# Patient Record
Sex: MALE | Race: BLACK OR AFRICAN AMERICAN | Employment: OTHER | URBAN - METROPOLITAN AREA
[De-identification: names, ages, dates, MRNs, and addresses within clinical notes are randomized per-mention and may not be internally consistent; named-entity substitution may affect disease eponyms.]

---

## 2018-05-28 ENCOUNTER — HOSPITAL ENCOUNTER (INPATIENT)
Age: 58
LOS: 3 days | Discharge: HOME | DRG: 638 | End: 2018-05-31
Attending: EMERGENCY MEDICINE | Admitting: INTERNAL MEDICINE
Payer: SELF-PAY

## 2018-05-28 DIAGNOSIS — R11.2 NON-INTRACTABLE VOMITING WITH NAUSEA, UNSPECIFIED VOMITING TYPE: ICD-10-CM

## 2018-05-28 DIAGNOSIS — E11.65 UNCONTROLLED TYPE 2 DIABETES MELLITUS WITH HYPERGLYCEMIA, WITH LONG-TERM CURRENT USE OF INSULIN (HCC): ICD-10-CM

## 2018-05-28 DIAGNOSIS — R63.4 WEIGHT LOSS: ICD-10-CM

## 2018-05-28 DIAGNOSIS — I10 ACCELERATED HYPERTENSION: ICD-10-CM

## 2018-05-28 DIAGNOSIS — N17.9 ACUTE KIDNEY INJURY (HCC): ICD-10-CM

## 2018-05-28 DIAGNOSIS — E11.10 DIABETIC KETOACIDOSIS WITHOUT COMA ASSOCIATED WITH TYPE 2 DIABETES MELLITUS (HCC): Primary | ICD-10-CM

## 2018-05-28 DIAGNOSIS — E87.5 ACUTE HYPERKALEMIA: ICD-10-CM

## 2018-05-28 DIAGNOSIS — Z79.4 UNCONTROLLED TYPE 2 DIABETES MELLITUS WITH HYPERGLYCEMIA, WITH LONG-TERM CURRENT USE OF INSULIN (HCC): ICD-10-CM

## 2018-05-28 DIAGNOSIS — E87.20 LACTIC ACID INCREASED: ICD-10-CM

## 2018-05-28 LAB
ALBUMIN SERPL-MCNC: 3.8 G/DL (ref 3.5–5)
ALBUMIN SERPL-MCNC: 4.3 G/DL (ref 3.5–5)
ALBUMIN/GLOB SERPL: 0.9 {RATIO} (ref 1.1–2.2)
ALBUMIN/GLOB SERPL: 0.9 {RATIO} (ref 1.1–2.2)
ALP SERPL-CCNC: 102 U/L (ref 45–117)
ALP SERPL-CCNC: 116 U/L (ref 45–117)
ALT SERPL-CCNC: 28 U/L (ref 12–78)
ALT SERPL-CCNC: 30 U/L (ref 12–78)
ANION GAP SERPL CALC-SCNC: 15 MMOL/L (ref 5–15)
ANION GAP SERPL CALC-SCNC: 19 MMOL/L (ref 5–15)
APPEARANCE UR: CLEAR
AST SERPL-CCNC: 10 U/L (ref 15–37)
AST SERPL-CCNC: 6 U/L (ref 15–37)
BACTERIA URNS QL MICRO: NEGATIVE /HPF
BASOPHILS # BLD: 0.1 K/UL (ref 0–0.1)
BASOPHILS NFR BLD: 0 % (ref 0–1)
BILIRUB SERPL-MCNC: 0.4 MG/DL (ref 0.2–1)
BILIRUB SERPL-MCNC: 0.5 MG/DL (ref 0.2–1)
BILIRUB UR QL: NEGATIVE
BUN SERPL-MCNC: 21 MG/DL (ref 6–20)
BUN SERPL-MCNC: 22 MG/DL (ref 6–20)
BUN/CREAT SERPL: 14 (ref 12–20)
BUN/CREAT SERPL: 14 (ref 12–20)
CALCIUM SERPL-MCNC: 10.7 MG/DL (ref 8.5–10.1)
CALCIUM SERPL-MCNC: 9.6 MG/DL (ref 8.5–10.1)
CHLORIDE SERPL-SCNC: 102 MMOL/L (ref 97–108)
CHLORIDE SERPL-SCNC: 98 MMOL/L (ref 97–108)
CO2 SERPL-SCNC: 18 MMOL/L (ref 21–32)
CO2 SERPL-SCNC: 20 MMOL/L (ref 21–32)
COLOR UR: ABNORMAL
CREAT SERPL-MCNC: 1.53 MG/DL (ref 0.7–1.3)
CREAT SERPL-MCNC: 1.6 MG/DL (ref 0.7–1.3)
DIFFERENTIAL METHOD BLD: ABNORMAL
EOSINOPHIL # BLD: 0 K/UL (ref 0–0.4)
EOSINOPHIL NFR BLD: 0 % (ref 0–7)
EPITH CASTS URNS QL MICRO: ABNORMAL /LPF
ERYTHROCYTE [DISTWIDTH] IN BLOOD BY AUTOMATED COUNT: 12.5 % (ref 11.5–14.5)
GLOBULIN SER CALC-MCNC: 4.1 G/DL (ref 2–4)
GLOBULIN SER CALC-MCNC: 4.6 G/DL (ref 2–4)
GLUCOSE BLD STRIP.AUTO-MCNC: 530 MG/DL (ref 65–100)
GLUCOSE BLD STRIP.AUTO-MCNC: 560 MG/DL (ref 65–100)
GLUCOSE BLD STRIP.AUTO-MCNC: 561 MG/DL (ref 65–100)
GLUCOSE SERPL-MCNC: 551 MG/DL (ref 65–100)
GLUCOSE SERPL-MCNC: 580 MG/DL (ref 65–100)
GLUCOSE UR STRIP.AUTO-MCNC: >1000 MG/DL
HCT VFR BLD AUTO: 44.3 % (ref 36.6–50.3)
HGB BLD-MCNC: 15.3 G/DL (ref 12.1–17)
HGB UR QL STRIP: NEGATIVE
HYALINE CASTS URNS QL MICRO: ABNORMAL /LPF (ref 0–5)
IMM GRANULOCYTES # BLD: 0.1 K/UL (ref 0–0.04)
IMM GRANULOCYTES NFR BLD AUTO: 0 % (ref 0–0.5)
KETONES UR QL STRIP.AUTO: 80 MG/DL
LACTATE SERPL-SCNC: 3.1 MMOL/L (ref 0.4–2)
LEUKOCYTE ESTERASE UR QL STRIP.AUTO: NEGATIVE
LIPASE SERPL-CCNC: 108 U/L (ref 73–393)
LYMPHOCYTES # BLD: 2.7 K/UL (ref 0.8–3.5)
LYMPHOCYTES NFR BLD: 20 % (ref 12–49)
MAGNESIUM SERPL-MCNC: 2.5 MG/DL (ref 1.6–2.4)
MCH RBC QN AUTO: 31.5 PG (ref 26–34)
MCHC RBC AUTO-ENTMCNC: 34.5 G/DL (ref 30–36.5)
MCV RBC AUTO: 91.2 FL (ref 80–99)
MONOCYTES # BLD: 0.4 K/UL (ref 0–1)
MONOCYTES NFR BLD: 3 % (ref 5–13)
NEUTS SEG # BLD: 10.3 K/UL (ref 1.8–8)
NEUTS SEG NFR BLD: 76 % (ref 32–75)
NITRITE UR QL STRIP.AUTO: NEGATIVE
NRBC # BLD: 0 K/UL (ref 0–0.01)
NRBC BLD-RTO: 0 PER 100 WBC
PH UR STRIP: 5 [PH] (ref 5–8)
PLATELET # BLD AUTO: 265 K/UL (ref 150–400)
PMV BLD AUTO: 10.2 FL (ref 8.9–12.9)
POTASSIUM SERPL-SCNC: 4.5 MMOL/L (ref 3.5–5.1)
POTASSIUM SERPL-SCNC: 5.2 MMOL/L (ref 3.5–5.1)
PROT SERPL-MCNC: 7.9 G/DL (ref 6.4–8.2)
PROT SERPL-MCNC: 8.9 G/DL (ref 6.4–8.2)
PROT UR STRIP-MCNC: NEGATIVE MG/DL
RBC # BLD AUTO: 4.86 M/UL (ref 4.1–5.7)
RBC #/AREA URNS HPF: ABNORMAL /HPF (ref 0–5)
SERVICE CMNT-IMP: ABNORMAL
SODIUM SERPL-SCNC: 135 MMOL/L (ref 136–145)
SODIUM SERPL-SCNC: 137 MMOL/L (ref 136–145)
SP GR UR REFRACTOMETRY: 1.02 (ref 1–1.03)
UA: UC IF INDICATED,UAUC: ABNORMAL
UROBILINOGEN UR QL STRIP.AUTO: 0.2 EU/DL (ref 0.2–1)
WBC # BLD AUTO: 13.5 K/UL (ref 4.1–11.1)
WBC URNS QL MICRO: ABNORMAL /HPF (ref 0–4)

## 2018-05-28 PROCEDURE — 74011250636 HC RX REV CODE- 250/636: Performed by: EMERGENCY MEDICINE

## 2018-05-28 PROCEDURE — 36415 COLL VENOUS BLD VENIPUNCTURE: CPT | Performed by: EMERGENCY MEDICINE

## 2018-05-28 PROCEDURE — 99285 EMERGENCY DEPT VISIT HI MDM: CPT

## 2018-05-28 PROCEDURE — 74011636637 HC RX REV CODE- 636/637: Performed by: EMERGENCY MEDICINE

## 2018-05-28 PROCEDURE — 82962 GLUCOSE BLOOD TEST: CPT

## 2018-05-28 PROCEDURE — 83690 ASSAY OF LIPASE: CPT | Performed by: EMERGENCY MEDICINE

## 2018-05-28 PROCEDURE — 83036 HEMOGLOBIN GLYCOSYLATED A1C: CPT | Performed by: EMERGENCY MEDICINE

## 2018-05-28 PROCEDURE — 96375 TX/PRO/DX INJ NEW DRUG ADDON: CPT

## 2018-05-28 PROCEDURE — 96361 HYDRATE IV INFUSION ADD-ON: CPT

## 2018-05-28 PROCEDURE — 85025 COMPLETE CBC W/AUTO DIFF WBC: CPT | Performed by: EMERGENCY MEDICINE

## 2018-05-28 PROCEDURE — 96374 THER/PROPH/DIAG INJ IV PUSH: CPT

## 2018-05-28 PROCEDURE — 80053 COMPREHEN METABOLIC PANEL: CPT | Performed by: EMERGENCY MEDICINE

## 2018-05-28 PROCEDURE — 81001 URINALYSIS AUTO W/SCOPE: CPT | Performed by: EMERGENCY MEDICINE

## 2018-05-28 PROCEDURE — 83605 ASSAY OF LACTIC ACID: CPT | Performed by: EMERGENCY MEDICINE

## 2018-05-28 PROCEDURE — 83735 ASSAY OF MAGNESIUM: CPT | Performed by: EMERGENCY MEDICINE

## 2018-05-28 PROCEDURE — 65660000000 HC RM CCU STEPDOWN

## 2018-05-28 RX ORDER — INSULIN LISPRO 100 [IU]/ML
INJECTION, SOLUTION INTRAVENOUS; SUBCUTANEOUS
Status: DISCONTINUED | OUTPATIENT
Start: 2018-05-29 | End: 2018-05-28

## 2018-05-28 RX ORDER — ENOXAPARIN SODIUM 100 MG/ML
40 INJECTION SUBCUTANEOUS DAILY
Status: DISCONTINUED | OUTPATIENT
Start: 2018-05-29 | End: 2018-05-31 | Stop reason: HOSPADM

## 2018-05-28 RX ORDER — ONDANSETRON 2 MG/ML
8 INJECTION INTRAMUSCULAR; INTRAVENOUS
Status: COMPLETED | OUTPATIENT
Start: 2018-05-28 | End: 2018-05-28

## 2018-05-28 RX ORDER — DEXTROSE 50 % IN WATER (D50W) INTRAVENOUS SYRINGE
12.5-25 AS NEEDED
Status: DISCONTINUED | OUTPATIENT
Start: 2018-05-28 | End: 2018-05-31 | Stop reason: HOSPADM

## 2018-05-28 RX ORDER — SODIUM CHLORIDE 0.9 % (FLUSH) 0.9 %
5-10 SYRINGE (ML) INJECTION EVERY 8 HOURS
Status: DISCONTINUED | OUTPATIENT
Start: 2018-05-28 | End: 2018-05-31 | Stop reason: HOSPADM

## 2018-05-28 RX ORDER — ONDANSETRON 2 MG/ML
4 INJECTION INTRAMUSCULAR; INTRAVENOUS
Status: DISCONTINUED | OUTPATIENT
Start: 2018-05-28 | End: 2018-05-31 | Stop reason: HOSPADM

## 2018-05-28 RX ORDER — INSULIN ASPART 100 [IU]/ML
INJECTION, SUSPENSION SUBCUTANEOUS
COMMUNITY
End: 2018-05-31

## 2018-05-28 RX ORDER — SODIUM CHLORIDE 0.9 % (FLUSH) 0.9 %
5-10 SYRINGE (ML) INJECTION AS NEEDED
Status: DISCONTINUED | OUTPATIENT
Start: 2018-05-28 | End: 2018-05-31 | Stop reason: HOSPADM

## 2018-05-28 RX ORDER — SODIUM CHLORIDE 9 MG/ML
125 INJECTION, SOLUTION INTRAVENOUS CONTINUOUS
Status: DISCONTINUED | OUTPATIENT
Start: 2018-05-28 | End: 2018-05-30

## 2018-05-28 RX ORDER — MAGNESIUM SULFATE 100 %
4 CRYSTALS MISCELLANEOUS AS NEEDED
Status: DISCONTINUED | OUTPATIENT
Start: 2018-05-28 | End: 2018-05-28

## 2018-05-28 RX ORDER — SODIUM CHLORIDE 0.9 % (FLUSH) 0.9 %
5-10 SYRINGE (ML) INJECTION AS NEEDED
Status: DISCONTINUED | OUTPATIENT
Start: 2018-05-28 | End: 2018-05-28

## 2018-05-28 RX ORDER — ACETAMINOPHEN 325 MG/1
650 TABLET ORAL
Status: DISCONTINUED | OUTPATIENT
Start: 2018-05-28 | End: 2018-05-31 | Stop reason: HOSPADM

## 2018-05-28 RX ORDER — INSULIN LISPRO 100 [IU]/ML
INJECTION, SOLUTION INTRAVENOUS; SUBCUTANEOUS
Status: DISCONTINUED | OUTPATIENT
Start: 2018-05-29 | End: 2018-05-29

## 2018-05-28 RX ORDER — DEXTROSE 50 % IN WATER (D50W) INTRAVENOUS SYRINGE
12.5-25 AS NEEDED
Status: DISCONTINUED | OUTPATIENT
Start: 2018-05-28 | End: 2018-05-28

## 2018-05-28 RX ORDER — MAGNESIUM SULFATE 100 %
4 CRYSTALS MISCELLANEOUS AS NEEDED
Status: DISCONTINUED | OUTPATIENT
Start: 2018-05-28 | End: 2018-05-29 | Stop reason: SDUPTHER

## 2018-05-28 RX ADMIN — INSULIN HUMAN 10 UNITS: 100 INJECTION, SOLUTION PARENTERAL at 21:03

## 2018-05-28 RX ADMIN — SODIUM CHLORIDE 1000 ML: 900 INJECTION, SOLUTION INTRAVENOUS at 20:57

## 2018-05-28 RX ADMIN — ONDANSETRON 8 MG: 2 INJECTION INTRAMUSCULAR; INTRAVENOUS at 21:03

## 2018-05-28 NOTE — IP AVS SNAPSHOT
[address] [facility] 
[phone] Patient: Chris Story MRN: [id number] PHM:6/18/9899 You are allergic to the following No active allergies Recent Documentation Height Weight BMI Smoking Status 1.88 m 80.4 kg 22.76 kg/m2 Never Smoker Emergency Contacts  (Rel.) Home Phone Work Phone Mobile Phone Huber Morales (Brother) [phone] -- -- About your hospitalization You were admitted on:  May 28, 2018 You last received care in the:  [address] You were discharged on:  May 31, 2018 Why you were hospitalized Your primary diagnosis was:  Not on File Your diagnoses also included:  Dka (Diabetic Ketoacidoses) ([facility]) Providers Seen During Your Hospitalization Provider Specialty Primary office phone Jarrod Pedroza MD Emergency Medicine [phone] Gurdeep Cage MD Internal Medicine [phone] Bri Ramos MD Internal Medicine [phone] Your Primary Care Physician (PCP) Primary Care Physician Office Phone Office Fax NONE ** None ** ** None ** Follow-up Information Follow up With Details Comments Contact Info None   None ([phone]) Patient stated that they have no PCP [facility]  Please  your medications at discharge - they are being paid by our medication assistance program [phone] My Medications STOP taking these medications NovoLOG Mix 70-30 U-100 Insuln 100 unit/mL (70-30) injection Generic drug:  insulin aspart protamine/insulin aspart TAKE these medications as instructed Instructions Each Dose to Equal  
 Morning Noon Evening Bedtime  
 alcohol swabs Padm Commonly known as:  ALCOHOL PREP PADS Your last dose was: Your next dose is: 1 Each by Apply Externally route two (2) times daily (with meals). 1 Each  
    
   
   
   
  
 glucose blood VI test strips strip Commonly known as:  blood glucose test  
   
Your last dose was: Your next dose is:    
   
   
 1 Each by Does Not Apply route See Admin Instructions. For use twice daily with One Touch Verio Flex glucometer 1 Each  
    
   
   
   
  
 insulin NPH/insulin regular 100 unit/mL (70-30) injection Commonly known as:  NovoLIN 70/30 U-100 Insulin Your last dose was: Your next dose is:    
   
   
 28 Units by SubCUTAneous route Before breakfast and dinner for 30 days. 28 Units Insulin Syringe-Needle U-100 1/2 mL 30 gauge x 5/16 Syrg Commonly known as:  INSULIN SYRINGE Your last dose was: Your next dose is:    
   
   
 1 Each by Does Not Apply route two (2) times daily (with meals) for 30 days. 1 Each  
    
   
   
   
  
 lancets 30 gauge Misc Your last dose was: Your next dose is: Take 1 Each by mouth two (2) times daily (with meals). 1 Each Needle (Disp) 30 G 30 gauge x 1/2\" Ndle Your last dose was: Your next dose is:    
   
   
 1 Each by Does Not Apply route two (2) times daily (with meals). 1 Each Where to Get Your Medications Information on where to get these meds will be given to you by the nurse or doctor. ! Ask your nurse or doctor about these medications  
  alcohol swabs Padm  
 glucose blood VI test strips strip  
 insulin NPH/insulin regular 100 unit/mL (70-30) injection Insulin Syringe-Needle U-100 1/2 mL 30 gauge x 5/16 Syrg  
 lancets 30 gauge Misc Needle (Disp) 30 G 30 gauge x 1/2\" Ndle Discharge Instructions HOSPITALIST DISCHARGE INSTRUCTIONS 
 
NAME: Shabnam Eric :  1960 MRN:  [id number] Date/Time:  2018 10:04 AM 
 ADMIT DATE: 5/28/2018 DISCHARGE DATE: 5/31/2018 Attending Physician: Donna Patel MD 
 
DISCHARGE DIAGNOSIS: 
Diabetic ketoacidosis Unintentional seight loss from poorly controlled DM  Thrush due to uncontrolled diabetes MEDICATIONS: 
See above · It is important that you take the medication exactly as they are prescribed. · Keep your medication in the bottles provided by the pharmacist and keep a list of the medication names, dosages, and times to be taken in your wallet. · Do not take other medications without consulting your doctor. Pain Management: per above medications What to do at [facility] Recommended diet:  Diabetic Diet Recommended activity: Activity as tolerated If you have questions regarding the hospital related prescriptions or hospital related issues please call [facility] at . You can always direct your questions to your primary care doctor if you are unable to reach your hospital physician; your PCP works as an extension of your hospital doctor just like your hospital doctor is an extension of your PCP for your time at [facility]. If you experience any of the following symptoms then please call your primary care physician or return to the emergency room if you cannot get hold of your doctor: 
Fever, chills, nausea, vomiting, diarrhea, change in mentation, falling, bleeding, shortness of breath Additional Instructions: 
 
 
Bring these papers with you to your follow up appointments. The papers will help your doctors be sure to continue the care plan from the hospital. 
 
 
 
 
 
 
Information obtained by : 
I understand that if any problems occur once I am at home I am to contact my physician. I understand and acknowledge receipt of the instructions indicated above. Physician's or R.N.'s Signature                                                                  Date/Time Patient or Representative Signature                                                          Date/Time Diabetic Ketoacidosis (DKA): Care Instructions Your Care Instructions Diabetic ketoacidosis (DKA) happens when the body does not have enough insulin and can't get the sugar it needs for energy. When the body can't use sugar for energy, it starts to use fat for energy. This process makes fatty acids called ketones. The ketones build up in the blood and change the chemical balance in your body. This problem can be very dangerous and needs to be treated. Without treatment, it can lead to a coma or death. DKA occurs most often in people with type 1 diabetes. But people with type 2 diabetes also can get it. DKA can be caused by many things. It can happen if you don't take enough insulin. It can also happen if you have an infection or illness like the flu. Sometimes it happens if you are very dehydrated. DKA can only be treated with insulin and fluids. These are often given in a vein (IV). Follow-up care is a key part of your treatment and safety. Be sure to make and go to all appointments, and call your doctor if you are having problems. It's also a good idea to know your test results and keep a list of the medicines you take. How can you care for yourself at home? To reduce your chance of ketoacidosis: · Take your insulin and other diabetes medicines on time and in the right dose. ¨ If an infection caused your DKA and your doctor prescribed antibiotics, take them as directed. Do not stop taking them just because you feel better. You need to take the full course of antibiotics.  
· Test your blood sugar before meals and at bedtime or as often as your doctor advises. This is the best way to know when your blood sugar is high so you can treat it early. Watching for symptoms is not as helpful. This is because you may not have symptoms until your blood sugar is very high. Or you may not notice them. · Teach others at work and at home how to check your blood sugar. Make sure that someone else knows how do it in case you can't. · Wear or carry medical identification at all times. This is very important in case you are too sick or injured to speak for yourself. · Talk to your doctor about when you can start to exercise again. · Eat regular meals that spread your calories and carbohydrate throughout the day. This will help keep your blood sugar steady. · When you are sick: ¨ Take your insulin and diabetes medicines. This is important even if you are vomiting and having trouble eating or drinking. Your blood sugar may go up because you are sick. If you are eating less than normal, you may need to change your dose of insulin. Talk with your doctor about a plan when you are well. Then you will know what to do when you are sick. ¨ Drink extra fluids to prevent dehydration. These include water, broth, and sugar-free drinks. If you don't drink enough, the insulin from your shot may not get into your blood. So your blood sugar may go up. ¨ Try to eat as you normally do, with a focus on healthy food choices. ¨ Check your blood sugar at least every 3 to 4 hours. Check it more often if it's rising fast. If your doctor has told you to take an extra insulin dose for high blood sugar levels (for example, above 240 mg/dL) be sure to take the right amount. If you're not sure how much to take, call your doctor. ¨ Check your temperature and pulse often. If your temperature goes up, call your doctor. You may be getting worse. ¨ If you take insulin, check your urine or blood for ketones, especially when you have high blood sugar (for example, above 240 mg/dL).  Call your doctor if your ketone level is moderate or high. If you know your blood sugar is high, treat it before it gets worse. · If you missed your usual dose of insulin or other diabetes medicine, take the missed dose or take the amount your doctor told you to take if this happens. · If you and your doctor decide on a dose of extra-fast-acting insulin, give yourself the right dose. If you take insulin and your doctor has not told you how much fast-acting insulin to take based on your blood sugar level, call your doctor. · Drink extra water or sugar-free drinks to prevent dehydration. · Wait 30 minutes after you take extra insulin or missed medicines. Then check your blood sugar again. · If symptoms of high blood sugar get worse or your blood sugar level keeps rising, call your doctor. If you start to feel sleepy or confused, call 911. When should you call for help? Call 911 anytime you think you may need emergency care. For example, call if: 
· You passed out (lost consciousness). · You are confused or cannot think clearly. · Your blood sugar is very high or very low. Watch closely for changes in your health, and be sure to contact your doctor if: 
· Your blood sugar stays outside the level your doctor set for you. · You have any problems. Where can you learn more? Go to http://waldemar-nikolai.info/. Gurjit Pineda in the search box to learn more about \"Diabetic Ketoacidosis (DKA): Care Instructions. \" Current as of: March 13, 2017 Content Version: 11.4 © 5702-8809 Healthwise, Incorporated. Care instructions adapted under license by [employer] (which disclaims liability or warranty for this information). If you have questions about a medical condition or this instruction, always ask your healthcare professional. [place] [address] any warranty or liability for your use of this information. Discharge Orders None [facility] Announcement We are excited to announce that we are making your provider's discharge notes available to you in Nexgence. You will see these notes when they are completed and signed by the physician that discharged you from your recent hospital stay. If you have any questions or concerns about any information you see in Nexgence, please call the Health Information Department where you were seen or reach out to your Primary Care Provider for more information about your plan of care. Introducing [facility]! [employer] introduces Nexgence patient portal. Now you can access parts of your medical record, email your doctor's office, and request medication refills online. 1. In your internet browser, go to [email or web address] 2. Click on the First Time User? Click Here link in the Sign In box. You will see the New Member Sign Up page. 3. Enter your Nexgence Access Code exactly as it appears below. You will not need to use this code after youve completed the sign-up process. If you do not sign up before the expiration date, you must request a new code. · Nexgence Access Code: [email or web address] Expires: 8/26/2018  8:14 PM 
 
4. Enter the last four digits of your Social Security Number (xxxx) and Date of Birth (mm/dd/yyyy) as indicated and click Submit. You will be taken to the next sign-up page. 5. Create a Nexgence ID. This will be your Nexgence login ID and cannot be changed, so think of one that is secure and easy to remember. 6. Create a Nexgence password. You can change your password at any time. 7. Enter your Password Reset Question and Answer. This can be used at a later time if you forget your password. 8. Enter your e-mail address. You will receive e-mail notification when new information is available in [address] 9. Click Sign Up. You can now view and download portions of your medical record.  
10. Click the Download Summary menu link to download a portable copy of your medical information. If you have questions, please visit the Frequently Asked Questions section of the InVenturehart website. Remember, MyChart is NOT to be used for urgent needs. For medical emergencies, dial 911. Now available from your iPhone and Android! General Information Please provide this summary of care documentation to your next provider. Patient Signature:  ____________________________________________________________ Date:  ____________________________________________________________  
  
Logan Maki Provider Signature:  ____________________________________________________________ Date:  ____________________________________________________________

## 2018-05-29 ENCOUNTER — APPOINTMENT (OUTPATIENT)
Dept: GENERAL RADIOLOGY | Age: 58
DRG: 638 | End: 2018-05-29
Attending: INTERNAL MEDICINE
Payer: SELF-PAY

## 2018-05-29 LAB
ADMINISTERED INITIALS, ADMINIT: NORMAL
ANION GAP SERPL CALC-SCNC: 10 MMOL/L (ref 5–15)
ANION GAP SERPL CALC-SCNC: 10 MMOL/L (ref 5–15)
ANION GAP SERPL CALC-SCNC: 13 MMOL/L (ref 5–15)
BASOPHILS # BLD: 0.1 K/UL (ref 0–0.1)
BASOPHILS NFR BLD: 0 % (ref 0–1)
BUN SERPL-MCNC: 17 MG/DL (ref 6–20)
BUN SERPL-MCNC: 19 MG/DL (ref 6–20)
BUN SERPL-MCNC: 19 MG/DL (ref 6–20)
BUN/CREAT SERPL: 14 (ref 12–20)
BUN/CREAT SERPL: 15 (ref 12–20)
BUN/CREAT SERPL: 15 (ref 12–20)
CALCIUM SERPL-MCNC: 9 MG/DL (ref 8.5–10.1)
CALCIUM SERPL-MCNC: 9.3 MG/DL (ref 8.5–10.1)
CALCIUM SERPL-MCNC: 9.9 MG/DL (ref 8.5–10.1)
CHLORIDE SERPL-SCNC: 103 MMOL/L (ref 97–108)
CHLORIDE SERPL-SCNC: 104 MMOL/L (ref 97–108)
CHLORIDE SERPL-SCNC: 110 MMOL/L (ref 97–108)
CHOLEST SERPL-MCNC: 181 MG/DL
CO2 SERPL-SCNC: 19 MMOL/L (ref 21–32)
CO2 SERPL-SCNC: 21 MMOL/L (ref 21–32)
CO2 SERPL-SCNC: 23 MMOL/L (ref 21–32)
CREAT SERPL-MCNC: 1.16 MG/DL (ref 0.7–1.3)
CREAT SERPL-MCNC: 1.25 MG/DL (ref 0.7–1.3)
CREAT SERPL-MCNC: 1.36 MG/DL (ref 0.7–1.3)
D50 ADMINISTERED, D50ADM: 0 ML
D50 ORDER, D50ORD: 0 ML
DIFFERENTIAL METHOD BLD: ABNORMAL
EOSINOPHIL # BLD: 0 K/UL (ref 0–0.4)
EOSINOPHIL NFR BLD: 0 % (ref 0–7)
ERYTHROCYTE [DISTWIDTH] IN BLOOD BY AUTOMATED COUNT: 12.3 % (ref 11.5–14.5)
EST. AVERAGE GLUCOSE BLD GHB EST-MCNC: 378 MG/DL
EST. AVERAGE GLUCOSE BLD GHB EST-MCNC: ABNORMAL MG/DL
GLSCOM COMMENTS: NORMAL
GLUCOSE BLD STRIP.AUTO-MCNC: 101 MG/DL (ref 65–100)
GLUCOSE BLD STRIP.AUTO-MCNC: 126 MG/DL (ref 65–100)
GLUCOSE BLD STRIP.AUTO-MCNC: 127 MG/DL (ref 65–100)
GLUCOSE BLD STRIP.AUTO-MCNC: 141 MG/DL (ref 65–100)
GLUCOSE BLD STRIP.AUTO-MCNC: 183 MG/DL (ref 65–100)
GLUCOSE BLD STRIP.AUTO-MCNC: 209 MG/DL (ref 65–100)
GLUCOSE BLD STRIP.AUTO-MCNC: 237 MG/DL (ref 65–100)
GLUCOSE BLD STRIP.AUTO-MCNC: 250 MG/DL (ref 65–100)
GLUCOSE BLD STRIP.AUTO-MCNC: 253 MG/DL (ref 65–100)
GLUCOSE BLD STRIP.AUTO-MCNC: 272 MG/DL (ref 65–100)
GLUCOSE BLD STRIP.AUTO-MCNC: 277 MG/DL (ref 65–100)
GLUCOSE BLD STRIP.AUTO-MCNC: 288 MG/DL (ref 65–100)
GLUCOSE BLD STRIP.AUTO-MCNC: 316 MG/DL (ref 65–100)
GLUCOSE BLD STRIP.AUTO-MCNC: 362 MG/DL (ref 65–100)
GLUCOSE BLD STRIP.AUTO-MCNC: 375 MG/DL (ref 65–100)
GLUCOSE BLD STRIP.AUTO-MCNC: 388 MG/DL (ref 65–100)
GLUCOSE BLD STRIP.AUTO-MCNC: 394 MG/DL (ref 65–100)
GLUCOSE BLD STRIP.AUTO-MCNC: 473 MG/DL (ref 65–100)
GLUCOSE BLD STRIP.AUTO-MCNC: 504 MG/DL (ref 65–100)
GLUCOSE BLD STRIP.AUTO-MCNC: 95 MG/DL (ref 65–100)
GLUCOSE SERPL-MCNC: 156 MG/DL (ref 65–100)
GLUCOSE SERPL-MCNC: 256 MG/DL (ref 65–100)
GLUCOSE SERPL-MCNC: 432 MG/DL (ref 65–100)
GLUCOSE, GLC: 101 MG/DL
GLUCOSE, GLC: 126 MG/DL
GLUCOSE, GLC: 141 MG/DL
GLUCOSE, GLC: 183 MG/DL
GLUCOSE, GLC: 209 MG/DL
GLUCOSE, GLC: 237 MG/DL
GLUCOSE, GLC: 250 MG/DL
GLUCOSE, GLC: 253 MG/DL
GLUCOSE, GLC: 272 MG/DL
GLUCOSE, GLC: 277 MG/DL
GLUCOSE, GLC: 288 MG/DL
GLUCOSE, GLC: 316 MG/DL
GLUCOSE, GLC: 362 MG/DL
GLUCOSE, GLC: 375 MG/DL
GLUCOSE, GLC: 388 MG/DL
GLUCOSE, GLC: 394 MG/DL
GLUCOSE, GLC: 473 MG/DL
GLUCOSE, GLC: 504 MG/DL
GLUCOSE, GLC: 96 MG/DL
HBA1C MFR BLD: 14.8 % (ref 4.2–6.3)
HBA1C MFR BLD: 15.2 % (ref 4.2–6.3)
HCT VFR BLD AUTO: 41.4 % (ref 36.6–50.3)
HDLC SERPL-MCNC: 48 MG/DL
HDLC SERPL: 3.8 {RATIO} (ref 0–5)
HGB BLD-MCNC: 14.3 G/DL (ref 12.1–17)
HIGH TARGET, HITG: 250 MG/DL
IMM GRANULOCYTES # BLD: 0.1 K/UL (ref 0–0.04)
IMM GRANULOCYTES NFR BLD AUTO: 1 % (ref 0–0.5)
INSULIN ADMINSTERED, INSADM: 1.3 UNITS/HOUR
INSULIN ADMINSTERED, INSADM: 1.6 UNITS/HOUR
INSULIN ADMINSTERED, INSADM: 10 UNITS/HOUR
INSULIN ADMINSTERED, INSADM: 10.6 UNITS/HOUR
INSULIN ADMINSTERED, INSADM: 11.2 UNITS/HOUR
INSULIN ADMINSTERED, INSADM: 12.4 UNITS/HOUR
INSULIN ADMINSTERED, INSADM: 12.7 UNITS/HOUR
INSULIN ADMINSTERED, INSADM: 13.4 UNITS/HOUR
INSULIN ADMINSTERED, INSADM: 15.1 UNITS/HOUR
INSULIN ADMINSTERED, INSADM: 15.1 UNITS/HOUR
INSULIN ADMINSTERED, INSADM: 16.9 UNITS/HOUR
INSULIN ADMINSTERED, INSADM: 17.3 UNITS/HOUR
INSULIN ADMINSTERED, INSADM: 18.4 UNITS/HOUR
INSULIN ADMINSTERED, INSADM: 3 UNITS/HOUR
INSULIN ADMINSTERED, INSADM: 3.9 UNITS/HOUR
INSULIN ADMINSTERED, INSADM: 5.9 UNITS/HOUR
INSULIN ADMINSTERED, INSADM: 8.6 UNITS/HOUR
INSULIN ADMINSTERED, INSADM: 8.9 UNITS/HOUR
INSULIN ADMINSTERED, INSADM: 9.1 UNITS/HOUR
INSULIN ORDER, INSORD: 1.3 UNITS/HOUR
INSULIN ORDER, INSORD: 1.6 UNITS/HOUR
INSULIN ORDER, INSORD: 10 UNITS/HOUR
INSULIN ORDER, INSORD: 10.6 UNITS/HOUR
INSULIN ORDER, INSORD: 11.2 UNITS/HOUR
INSULIN ORDER, INSORD: 12.4 UNITS/HOUR
INSULIN ORDER, INSORD: 12.7 UNITS/HOUR
INSULIN ORDER, INSORD: 13.4 UNITS/HOUR
INSULIN ORDER, INSORD: 15.1 UNITS/HOUR
INSULIN ORDER, INSORD: 15.1 UNITS/HOUR
INSULIN ORDER, INSORD: 16.9 UNITS/HOUR
INSULIN ORDER, INSORD: 17.3 UNITS/HOUR
INSULIN ORDER, INSORD: 18.4 UNITS/HOUR
INSULIN ORDER, INSORD: 3 UNITS/HOUR
INSULIN ORDER, INSORD: 3.9 UNITS/HOUR
INSULIN ORDER, INSORD: 5.9 UNITS/HOUR
INSULIN ORDER, INSORD: 8.6 UNITS/HOUR
INSULIN ORDER, INSORD: 8.9 UNITS/HOUR
INSULIN ORDER, INSORD: 9.1 UNITS/HOUR
LACTATE SERPL-SCNC: 1.5 MMOL/L (ref 0.4–2)
LDLC SERPL CALC-MCNC: 102 MG/DL (ref 0–100)
LIPID PROFILE,FLP: ABNORMAL
LOW TARGET, LOT: 150 MG/DL
LYMPHOCYTES # BLD: 3.6 K/UL (ref 0.8–3.5)
LYMPHOCYTES NFR BLD: 24 % (ref 12–49)
MAGNESIUM SERPL-MCNC: 2.3 MG/DL (ref 1.6–2.4)
MCH RBC QN AUTO: 30.8 PG (ref 26–34)
MCHC RBC AUTO-ENTMCNC: 34.5 G/DL (ref 30–36.5)
MCV RBC AUTO: 89.2 FL (ref 80–99)
MINUTES UNTIL NEXT BG, NBG: 60 MIN
MONOCYTES # BLD: 0.5 K/UL (ref 0–1)
MONOCYTES NFR BLD: 4 % (ref 5–13)
MULTIPLIER, MUL: 0.02
MULTIPLIER, MUL: 0.03
MULTIPLIER, MUL: 0.04
MULTIPLIER, MUL: 0.05
MULTIPLIER, MUL: 0.06
MULTIPLIER, MUL: 0.07
MULTIPLIER, MUL: 0.08
NEUTS SEG # BLD: 10.4 K/UL (ref 1.8–8)
NEUTS SEG NFR BLD: 71 % (ref 32–75)
NRBC # BLD: 0 K/UL (ref 0–0.01)
NRBC BLD-RTO: 0 PER 100 WBC
ORDER INITIALS, ORDINIT: NORMAL
PLATELET # BLD AUTO: 216 K/UL (ref 150–400)
PMV BLD AUTO: 9.8 FL (ref 8.9–12.9)
POTASSIUM SERPL-SCNC: 3.7 MMOL/L (ref 3.5–5.1)
POTASSIUM SERPL-SCNC: 3.8 MMOL/L (ref 3.5–5.1)
POTASSIUM SERPL-SCNC: 3.9 MMOL/L (ref 3.5–5.1)
RBC # BLD AUTO: 4.64 M/UL (ref 4.1–5.7)
SERVICE CMNT-IMP: ABNORMAL
SERVICE CMNT-IMP: NORMAL
SODIUM SERPL-SCNC: 136 MMOL/L (ref 136–145)
SODIUM SERPL-SCNC: 136 MMOL/L (ref 136–145)
SODIUM SERPL-SCNC: 141 MMOL/L (ref 136–145)
T4 FREE SERPL-MCNC: 0.9 NG/DL (ref 0.8–1.5)
TRIGL SERPL-MCNC: 155 MG/DL (ref ?–150)
TSH SERPL DL<=0.05 MIU/L-ACNC: 0.34 UIU/ML (ref 0.36–3.74)
VLDLC SERPL CALC-MCNC: 31 MG/DL
WBC # BLD AUTO: 14.6 K/UL (ref 4.1–11.1)

## 2018-05-29 PROCEDURE — 74011636637 HC RX REV CODE- 636/637: Performed by: HOSPITALIST

## 2018-05-29 PROCEDURE — 80061 LIPID PANEL: CPT | Performed by: INTERNAL MEDICINE

## 2018-05-29 PROCEDURE — 83735 ASSAY OF MAGNESIUM: CPT | Performed by: INTERNAL MEDICINE

## 2018-05-29 PROCEDURE — 84443 ASSAY THYROID STIM HORMONE: CPT | Performed by: INTERNAL MEDICINE

## 2018-05-29 PROCEDURE — 74011000258 HC RX REV CODE- 258: Performed by: EMERGENCY MEDICINE

## 2018-05-29 PROCEDURE — 74011250637 HC RX REV CODE- 250/637: Performed by: INTERNAL MEDICINE

## 2018-05-29 PROCEDURE — 74011636637 HC RX REV CODE- 636/637: Performed by: INTERNAL MEDICINE

## 2018-05-29 PROCEDURE — 74011000258 HC RX REV CODE- 258: Performed by: HOSPITALIST

## 2018-05-29 PROCEDURE — 65660000000 HC RM CCU STEPDOWN

## 2018-05-29 PROCEDURE — 36415 COLL VENOUS BLD VENIPUNCTURE: CPT | Performed by: INTERNAL MEDICINE

## 2018-05-29 PROCEDURE — 80048 BASIC METABOLIC PNL TOTAL CA: CPT | Performed by: INTERNAL MEDICINE

## 2018-05-29 PROCEDURE — 80048 BASIC METABOLIC PNL TOTAL CA: CPT

## 2018-05-29 PROCEDURE — 82962 GLUCOSE BLOOD TEST: CPT

## 2018-05-29 PROCEDURE — L3710 EO ELAS W/METAL JNTS PRE OTS: HCPCS

## 2018-05-29 PROCEDURE — 85025 COMPLETE CBC W/AUTO DIFF WBC: CPT | Performed by: INTERNAL MEDICINE

## 2018-05-29 PROCEDURE — 71045 X-RAY EXAM CHEST 1 VIEW: CPT

## 2018-05-29 PROCEDURE — 84439 ASSAY OF FREE THYROXINE: CPT | Performed by: INTERNAL MEDICINE

## 2018-05-29 PROCEDURE — 74011000258 HC RX REV CODE- 258: Performed by: INTERNAL MEDICINE

## 2018-05-29 PROCEDURE — 74011250636 HC RX REV CODE- 250/636: Performed by: INTERNAL MEDICINE

## 2018-05-29 PROCEDURE — 74011250637 HC RX REV CODE- 250/637: Performed by: HOSPITALIST

## 2018-05-29 PROCEDURE — 83036 HEMOGLOBIN GLYCOSYLATED A1C: CPT | Performed by: INTERNAL MEDICINE

## 2018-05-29 PROCEDURE — 74011636637 HC RX REV CODE- 636/637: Performed by: EMERGENCY MEDICINE

## 2018-05-29 PROCEDURE — 83605 ASSAY OF LACTIC ACID: CPT | Performed by: INTERNAL MEDICINE

## 2018-05-29 RX ORDER — INSULIN GLARGINE 100 [IU]/ML
15 INJECTION, SOLUTION SUBCUTANEOUS
Status: DISCONTINUED | OUTPATIENT
Start: 2018-05-29 | End: 2018-05-29

## 2018-05-29 RX ORDER — DEXTROSE MONOHYDRATE AND SODIUM CHLORIDE 5; .45 G/100ML; G/100ML
100 INJECTION, SOLUTION INTRAVENOUS CONTINUOUS
Status: DISCONTINUED | OUTPATIENT
Start: 2018-05-29 | End: 2018-05-29

## 2018-05-29 RX ORDER — LISINOPRIL 5 MG/1
5 TABLET ORAL DAILY
Status: DISCONTINUED | OUTPATIENT
Start: 2018-05-30 | End: 2018-05-30

## 2018-05-29 RX ORDER — INSULIN GLARGINE 100 [IU]/ML
20 INJECTION, SOLUTION SUBCUTANEOUS
Status: DISCONTINUED | OUTPATIENT
Start: 2018-05-29 | End: 2018-05-30

## 2018-05-29 RX ORDER — MAGNESIUM SULFATE 100 %
4 CRYSTALS MISCELLANEOUS AS NEEDED
Status: DISCONTINUED | OUTPATIENT
Start: 2018-05-29 | End: 2018-05-31 | Stop reason: HOSPADM

## 2018-05-29 RX ORDER — INSULIN LISPRO 100 [IU]/ML
INJECTION, SOLUTION INTRAVENOUS; SUBCUTANEOUS
Status: DISCONTINUED | OUTPATIENT
Start: 2018-05-29 | End: 2018-05-31 | Stop reason: HOSPADM

## 2018-05-29 RX ORDER — INSULIN GLARGINE 100 [IU]/ML
0.3 INJECTION, SOLUTION SUBCUTANEOUS
Status: DISCONTINUED | OUTPATIENT
Start: 2018-05-29 | End: 2018-05-29

## 2018-05-29 RX ORDER — FLUCONAZOLE 100 MG/1
200 TABLET ORAL DAILY
Status: DISCONTINUED | OUTPATIENT
Start: 2018-05-29 | End: 2018-05-30 | Stop reason: ALTCHOICE

## 2018-05-29 RX ORDER — DEXTROSE 50 % IN WATER (D50W) INTRAVENOUS SYRINGE
12.5-25 AS NEEDED
Status: DISCONTINUED | OUTPATIENT
Start: 2018-05-29 | End: 2018-05-31 | Stop reason: HOSPADM

## 2018-05-29 RX ADMIN — SODIUM CHLORIDE 10 UNITS/HR: 900 INJECTION, SOLUTION INTRAVENOUS at 18:48

## 2018-05-29 RX ADMIN — DEXTROSE MONOHYDRATE AND SODIUM CHLORIDE 100 ML/HR: 5; .45 INJECTION, SOLUTION INTRAVENOUS at 16:31

## 2018-05-29 RX ADMIN — SODIUM CHLORIDE 8.9 UNITS/HR: 900 INJECTION, SOLUTION INTRAVENOUS at 00:14

## 2018-05-29 RX ADMIN — Medication 10 ML: at 05:46

## 2018-05-29 RX ADMIN — ACETAMINOPHEN 650 MG: 325 TABLET ORAL at 06:28

## 2018-05-29 RX ADMIN — SODIUM CHLORIDE 18.4 UNITS/HR: 900 INJECTION, SOLUTION INTRAVENOUS at 19:56

## 2018-05-29 RX ADMIN — FLUCONAZOLE 200 MG: 100 TABLET ORAL at 20:09

## 2018-05-29 RX ADMIN — ENOXAPARIN SODIUM 40 MG: 100 INJECTION SUBCUTANEOUS at 10:38

## 2018-05-29 RX ADMIN — Medication 10 ML: at 22:50

## 2018-05-29 RX ADMIN — Medication 10 ML: at 14:25

## 2018-05-29 RX ADMIN — SODIUM CHLORIDE 8.9 UNITS/HR: 900 INJECTION, SOLUTION INTRAVENOUS at 00:12

## 2018-05-29 RX ADMIN — SODIUM CHLORIDE 16.9 UNITS/HR: 900 INJECTION, SOLUTION INTRAVENOUS at 21:57

## 2018-05-29 RX ADMIN — INSULIN GLARGINE 20 UNITS: 100 INJECTION, SOLUTION SUBCUTANEOUS at 20:50

## 2018-05-29 RX ADMIN — SODIUM CHLORIDE 16.9 UNITS/HR: 900 INJECTION, SOLUTION INTRAVENOUS at 21:00

## 2018-05-29 RX ADMIN — SODIUM CHLORIDE 3.9 UNITS/HR: 900 INJECTION, SOLUTION INTRAVENOUS at 06:33

## 2018-05-29 RX ADMIN — SODIUM CHLORIDE 17.3 UNITS/HR: 900 INJECTION, SOLUTION INTRAVENOUS at 22:41

## 2018-05-29 RX ADMIN — DEXTROSE MONOHYDRATE AND SODIUM CHLORIDE 100 ML/HR: 5; .45 INJECTION, SOLUTION INTRAVENOUS at 05:46

## 2018-05-29 RX ADMIN — SODIUM CHLORIDE 125 ML/HR: 900 INJECTION, SOLUTION INTRAVENOUS at 00:14

## 2018-05-29 NOTE — PROGRESS NOTES
Hospitalist Progress Note  Fidel Segovia MD      NAME:  Kei Taylor  :  1960  MRN:  [id number]  PCP:   None  Date of Service:  2018    Assessment & Plan:     Type II Diabetes uncontrolled (A1c 14.8) presented in DKA: resolved  Hyperkalemia: improving  TANIKA: resolved  Elevated lactic Acid: resolved  Leukocytosis: suspect reactive: sl worse  - overall has improved  - DC insulin drip and change to lantus along with ISS  - monitor labs in am  - diabetic education     Elevated Blood pressure: patient deneis PMH of hypertension  - Will order ACEI lisinopril for renal protective properties and HTN.    Unintentional Weight Loss from poorly controlled DM: reports 20 lbs lost in last month    Thrush likely due to poor DM control  - Will treat with diflucan po  - Not interested in HIV test at this time.     Code Status: Full  Surrogate Decision Maker: Brother  DVT Prophylaxis: Lovenox  GI Prophylaxis: not indicated  Care Plan discussed with: Patient/Family and Nurse  Disposition: Home w/Family, HH PT, OT, RN and TBD       Reason for visit:   Recheck DKA    Admission Summary:   Kei Taylor is a 66 y.o.  male who presents with DKA. Patient reports taht his PCP stopped his insulin 2 years ago. He stopped to going to the doctor after this. About 1 month ago he had concerns that he again needed insulin and obtained OTC 70/30 and was taking 20 units in the am and 5 units at night. Patient reports taht his blood glucose measurements were okay at home. He presented to the ED at [id number] [address] [facility] on  for weakness. Patient reports vomiting, weight loss of 20 lbs in the last month and polydipsia/polyuria. In the ED patient' BMP showed glucose of 580 with anion gap and hyperkalemia.       Interval history / Subjective:   Patient seen and examined. Reports feeling better but c/o thrush. Last HIV test 6 months ago and negative per pt.   Not interested in HIV test.     Review of Systems:     Symptom Y/N Comments   Symptom Y/N Comments   Fever/Chills n     Chest Pain n     Poor Appetite       Edema       Cough       Abdominal Pain n     Sputum       Joint Pain       SOB/CAZARES n     Pruritis/Rash       Nausea/vomit       Tolerating PT/OT       Diarrhea      Tolerating Diet       Constipation       Other          NOT obtained      due to     Physical Examination:   Last 24hrs VS reviewed since prior progress note. Most recent are:  Patient Vitals for the past 12 hrs:   Temp Pulse Resp BP SpO2   05/29/18 1632 97.8 °F (36.6 °C) 62 16 120/80 100 %   05/29/18 1039 97.9 °F (36.6 °C) 69 16 120/78 98 %   05/29/18 0719 97.7 °F (36.5 °C) 70 16 123/90 100 %       Intake/Output Summary (Last 24 hours) at 05/29/18 1912  Last data filed at 05/29/18 1334   Gross per 24 hour   Intake             1960 ml   Output             1400 ml   Net              560 ml      General appearance: alert, cooperative, NAD  Head: Normocephalic, atraumatic  Eyes: PERRL and EOMI sclera clear  Throat: Delta Rein in oral mucosa   Neck: supple, no tenderness  Lungs: CTA with good BS and normal effort  Heart: S1-S2, RRR, No MGR  Abdomen: SNTBS(+), No HSM  Extremities: extremities normal, atraumatic, no cyanosis, no edema   Pulses: 2+ and symmetric  Skin: Skin color, texture, turgor normal. No rashes or lesions  Neurologic: Alert and oriented X 3, normal strength and tone. Normal symmetric reflexes. Normal coordination and gait  Psychiatric: Not anxious, cooperative, normal affect    Data Review:   Review and/or order of clinical lab test    Xr Chest Port    Result Date: 5/29/2018  IMPRESSION: No acute process.      Echo Results  (Last 48 hours)    None        Labs:     Recent Labs      05/29/18   0209  05/28/18 2047   WBC  14.6*  13.5*   HGB  14.3  15.3   HCT  41.4  44.3   PLT  216  265     Recent Labs      05/29/18   1302  05/29/18   0609  05/29/18   0209   05/28/18 2047   NA  136  141  136   < >  135*   K  3.7  3.8  3.9   < >  5.2*   CL  103  110*  104   < >  98   CO2  23  21  19*   < >  18*   BUN  17  19  19   < >  22*   CREA  1.16  1.25  1.36*   < >  1.60*   GLU  256*  156*  432*   < >  580*   CA  9.0  9.9  9.3   < >  10.7*   MG   --    --   2.3   --   2.5*    < > = values in this interval not displayed. Recent Labs      05/28/18   2211  05/28/18 2047   SGOT  6*  10*   ALT  28  30   AP  102  116   TBILI  0.4  0.5   TP  7.9  8.9*   ALB  3.8  4.3   GLOB  4.1*  4.6*   LPSE   --   108     No results for input(s): INR, PTP, APTT in the last 72 hours. No lab exists for component: INREXT   No results for input(s): PH, PCO2, PO2 in the last 72 hours.   Lab Results   Component Value Date/Time    Cholesterol, total 181 05/29/2018 02:09 AM    HDL Cholesterol 48 05/29/2018 02:09 AM    LDL, calculated 102 (H) 05/29/2018 02:09 AM    Triglyceride 155 (H) 05/29/2018 02:09 AM    CHOL/HDL Ratio 3.8 05/29/2018 02:09 AM     Lab Results   Component Value Date/Time    Glucose (POC) 277 (H) 05/29/2018 06:41 PM    Glucose (POC) 95 05/29/2018 05:27 PM    Glucose (POC) 126 (H) 05/29/2018 04:01 PM    Glucose (POC) 209 (H) 05/29/2018 02:17 PM    Glucose (POC) 253 (H) 05/29/2018 12:56 PM     Lab Results   Component Value Date/Time    Color YELLOW/STRAW 05/28/2018 09:06 PM    Appearance CLEAR 05/28/2018 09:06 PM    Specific gravity 1.020 05/28/2018 09:06 PM    pH (UA) 5.0 05/28/2018 09:06 PM    Protein NEGATIVE  05/28/2018 09:06 PM    Glucose >1000 (A) 05/28/2018 09:06 PM    Ketone 80 (A) 05/28/2018 09:06 PM    Bilirubin NEGATIVE  05/28/2018 09:06 PM    Urobilinogen 0.2 05/28/2018 09:06 PM    Nitrites NEGATIVE  05/28/2018 09:06 PM    Leukocyte Esterase NEGATIVE  05/28/2018 09:06 PM    Epithelial cells FEW 05/28/2018 09:06 PM    Bacteria NEGATIVE  05/28/2018 09:06 PM    WBC 0-4 05/28/2018 09:06 PM    RBC 0-5 05/28/2018 09:06 PM     All Micro Results     None        Medications Reviewed:     Current Facility-Administered Medications:     dextrose 5 % - 0.45% NaCl infusion, 100 mL/hr, IntraVENous, CONTINUOUS, Gaetano Alexandra MD, Last Rate: 100 mL/hr at 05/29/18 1631, 100 mL/hr at 05/29/18 1631    sodium chloride (NS) flush 5-10 mL, 5-10 mL, IntraVENous, Q8H, Kay Bernal MD, 10 mL at 05/29/18 1425    sodium chloride (NS) flush 5-10 mL, 5-10 mL, IntraVENous, PRN, Kay Bernal MD    insulin regular (NOVOLIN R, HUMULIN R) 100 Units in 0.9% sodium chloride 100 mL infusion, 0-50 Units/hr, IntraVENous, TITRATE, Kay Bernal MD, Last Rate: 10 mL/hr at 05/29/18 1848, 10 Units/hr at 05/29/18 1848    insulin lispro (HUMALOG) injection, , SubCUTAneous, TIDAC, Kay Bernal MD, Stopped at 05/29/18 0730    glucose chewable tablet 16 g, 4 Tab, Oral, PRN, Kay Bernal MD    dextrose (D50W) injection syrg 12.5-25 g, 12.5-25 g, IntraVENous, PRN, Kay Bernal MD    glucagon (GLUCAGEN) injection 1 mg, 1 mg, IntraMUSCular, PRN, Kay Bernal MD    acetaminophen (TYLENOL) tablet 650 mg, 650 mg, Oral, Q4H PRN, Kay Bernal MD, 650 mg at 05/29/18 0628    ondansetron (ZOFRAN) injection 4 mg, 4 mg, IntraVENous, Q4H PRN, Kay Bernal MD    enoxaparin (LOVENOX) injection 40 mg, 40 mg, SubCUTAneous, DAILY, Kay Bernal MD, 40 mg at 05/29/18 1038    0.9% sodium chloride infusion, 125 mL/hr, IntraVENous, CONTINUOUS, Kay Bernal MD, Last Rate: 125 mL/hr at 05/29/18 0014, 125 mL/hr at 05/29/18 0014    ______________________________________________________________________  EXPECTED LENGTH OF STAY: 2d 21h  ACTUAL LENGTH OF STAY:          1                 Keesha Peraza MD

## 2018-05-29 NOTE — PROGRESS NOTES
TRANSFER - IN REPORT:    Verbal report received from Anna RN(name) on Taye Moore  being received from ED(unit) for routine progression of care      Report consisted of patients Situation, Background, Assessment and   Recommendations(SBAR). Information from the following report(s) SBAR, Kardex, Intake/Output and Recent Results was reviewed with the receiving nurse. Opportunity for questions and clarification was provided. Assessment completed upon patients arrival to unit and care assumed. PCU SHIFT NURSING NOTE          Shift Summary:   0104: Patient arrived to room [id number]. No complaints of pain, sob. Dual skin assessment complete. Call bell in reach. Will monitor   0525: MD paged. . Orders received for D5 1/2 NS @ 100/hr  6198: Patient complaining of slight headache. PRN tylenol administered   Bedside shift change report given to Cait (oncoming nurse) by Galilea Stock (offgoing nurse). Report included the following information SBAR, Kardex, Intake/Output and Recent Results. Admission Date 5/28/2018   Admission Diagnosis DKA (diabetic ketoacidoses) ([facility] [address]   Consults None        Consults   []PT   []OT   []Speech   []Case Management      [] Palliative      Cardiac Monitoring Order   []Yes   []No     IV drips   []Yes    Drip:                            Dose:  Drip:                            Dose:  Drip:                            Dose:   []No     GI Prophylaxis   []Yes   []No         DVT Prophylaxis   SCDs:             Thai stockings:         [] Medication   []Contraindicated   []None      Activity Level           Purposeful Rounding every 1-2 hour?    []Yes   George Score  Total Score: 1   Bed Alarm (If score 3 or >)   []Yes   [] Refused (See signed refusal form in chart)   Masood Score      Masood Score (if score 14 or less)   []PMT consult   []Wound Care consult      []Specialty bed   [] Nutrition consult          Needs prior to discharge:   Home O2 required:    []Yes   []No If yes, how much O2 required? Other:    Last Bowel Movement:        Influenza Vaccine          Pneumonia Vaccine           Diet Active Orders   Diet    DIET DIABETIC WITH OPTIONS Consistent Carb 1800kcal; Regular; 2 GM NA (House Low NA)      LDAs               Peripheral IV 05/28/18 Left Antecubital (Active)   Site Assessment Clean, dry, & intact 5/28/2018  8:56 PM   Phlebitis Assessment 0 5/28/2018  8:56 PM   Infiltration Assessment 0 5/28/2018  8:56 PM   Dressing Status Clean, dry, & intact 5/28/2018  8:56 PM   Dressing Type Tape;Transparent 5/28/2018  8:56 PM   Hub Color/Line Status Pink 5/28/2018  8:56 PM   Alcohol Cap Used Yes 5/28/2018  8:56 PM                      Urinary Catheter      Intake & Output   Date 05/28/18 0700 - 05/29/18 0659 05/29/18 0700 - 05/30/18 0659   Shift 9765-3265 7722-6381 24 Hour Total 9717-6249 9444-5187 24 Hour Total   I  N  T  A  K  E   P.O.  1000 1000         P. O.  1000 1000       Shift Total  (mL/kg)  1000  (12.4) 1000  (12.4)      O  U  T  P  U  T   Urine  1200 1200         Urine Voided  1200 1200       Shift Total  (mL/kg)  1200  (14.9) 1200  (14.9)      NET  -200 -200      Weight (kg)  80.4 80.4 80.4 80.4 80.4         Readmission Risk Assessment Tool Score Low Risk            6       Total Score        4 Pt. Coverage (Medicare=5 , Medicaid, or Self-Pay=4)    2 Charlson Comorbidity Score (Age + Comorbid Conditions)        Criteria that do not apply:    Has Seen PCP in Last 6 Months (Yes=3, No=0)    . Living with Significant Other. Assisted Living. LTAC. SNF.  or   Rehab    Patient Length of Stay (>5 days = 3)    IP Visits Last 12 Months (1-3=4, 4=9, >4=11)       Expected Length of Stay - - -   Actual Length of Stay 1

## 2018-05-29 NOTE — ED NOTES
TRANSFER - OUT REPORT:    Verbal report given to SUPRIYA Connor (name) on Sherry Velez  being transferred to [facility] [id number] (unit) for routine progression of care       Report consisted of patients Situation, Background, Assessment and   Recommendations(SBAR). Information from the following report(s) SBAR, Kardex, ED Summary, Intake/Output, MAR, Recent Results, Med Rec Status and Cardiac Rhythm SB to NSR was reviewed with the receiving nurse. Lines:   Peripheral IV 05/28/18 Left Antecubital (Active)   Site Assessment Clean, dry, & intact 5/28/2018  8:56 PM   Phlebitis Assessment 0 5/28/2018  8:56 PM   Infiltration Assessment 0 5/28/2018  8:56 PM   Dressing Status Clean, dry, & intact 5/28/2018  8:56 PM   Dressing Type Tape;Transparent 5/28/2018  8:56 PM   Hub Color/Line Status Pink 5/28/2018  8:56 PM   Alcohol Cap Used Yes 5/28/2018  8:56 PM        Opportunity for questions and clarification was provided. Patient transported with:   Monitor  Registered Nurse  Tech   Dual RN skin assessment to be completed by receiving RN. Belongings & chart transferred to floor.            \

## 2018-05-29 NOTE — ED PROVIDER NOTES
EMERGENCY DEPARTMENT HISTORY AND PHYSICAL EXAM      Date: 5/28/2018  Patient Name: Britney Brooks    History of Presenting Illness     Chief Complaint   Patient presents with    High Blood Sugar     pt reports that \"I know my sugar is high, but I haven't checked it. I have no appetite, I'm throwing up. \"; pt reports that he has been taking his insulin       History Provided By: Patient    HPI: Britney Brooks, 66 y.o. male with PMHx significant for DM, presents ambulatory to the ED with cc of  intermittent episodes of nausea and vomiting with generalized weakness onset last night. He states his blood glucose was in the 130's, however today was in the 320's. Pt reports he was admitted for DKA two years ago, but has since been taken off all DM medications and insulin. He states he had a bottle of insulin at home and endorses administering 5cc's this morning. Pt also notes persistent polyuria and polydipsia. He is otherwise healthy and denies any longstanding medications. Pt states he is from [place] and is currently visiting for the holiday. Pt specifically denies any abdominal pain, dysuria, fevers and chills. There are no other complaints, changes, or physical findings at this time. PCP: None        Past History     Past Medical History:  Past Medical History:   Diagnosis Date    Diabetes [facility])        Past Surgical History:  History reviewed. No pertinent surgical history. Family History:  Family History   Problem Relation Age of Onset    Family history unknown: Yes       Social History:  Social History   Substance Use Topics    Smoking status: Never Smoker    Smokeless tobacco: Never Used    Alcohol use No       Allergies:  No Known Allergies      Review of Systems   Review of Systems   Constitutional: Negative for chills and fever. HENT: Negative. Negative for congestion, facial swelling, rhinorrhea, sore throat, trouble swallowing and voice change. Eyes: Negative.     Respiratory: Negative. Negative for apnea, cough, chest tightness, shortness of breath and wheezing. Cardiovascular: Negative. Negative for chest pain, palpitations and leg swelling. Gastrointestinal: Positive for diarrhea and vomiting. Negative for abdominal distention, abdominal pain, blood in stool, constipation and nausea. Endocrine: Positive for polydipsia and polyuria. Negative for cold intolerance and heat intolerance. Genitourinary: Negative. Negative for difficulty urinating, dysuria, flank pain, frequency, hematuria and urgency. Musculoskeletal: Negative. Negative for arthralgias, back pain, myalgias, neck pain and neck stiffness. Skin: Negative. Negative for color change and rash. Neurological: Positive for weakness. Negative for dizziness, syncope, facial asymmetry, speech difficulty, light-headedness, numbness and headaches. Hematological: Negative. Does not bruise/bleed easily. Psychiatric/Behavioral: Negative. Negative for confusion and self-injury. The patient is not nervous/anxious. Physical Exam   Physical Exam   Constitutional: He is oriented to person, place, and time. Vital signs are normal. He appears well-developed and well-nourished. He is cooperative. Non-toxic appearance. No distress. HENT:   Head: Normocephalic and atraumatic. Mouth/Throat: Mucous membranes are dry. No posterior oropharyngeal erythema. Eyes: Conjunctivae and EOM are normal. Pupils are equal, round, and reactive to light. Neck: Normal range of motion. Cardiovascular: Normal rate, regular rhythm, normal heart sounds and intact distal pulses. Exam reveals no gallop and no friction rub. No murmur heard. Pulmonary/Chest: Effort normal and breath sounds normal. No respiratory distress. He has no wheezes. He has no rales. He exhibits no tenderness. Abdominal: Soft. Bowel sounds are normal. He exhibits no distension and no mass. There is no tenderness. There is no rebound and no guarding. Musculoskeletal: Normal range of motion. He exhibits no edema, tenderness or deformity. Neurological: He is alert and oriented to person, place, and time. He displays normal reflexes. No cranial nerve deficit. He exhibits normal muscle tone. Coordination normal.   Skin: Skin is warm. No rash noted. Psychiatric: He has a normal mood and affect. Nursing note and vitals reviewed. Diagnostic Study Results     Labs -     Recent Results (from the past 12 hour(s))   GLUCOSE, POC    Collection Time: 05/28/18  8:35 PM   Result Value Ref Range    Glucose (POC) 560 (H) 65 - 100 mg/dL    Performed by Bri Lassiter    CBC WITH AUTOMATED DIFF    Collection Time: 05/28/18  8:47 PM   Result Value Ref Range    WBC 13.5 (H) 4.1 - 11.1 K/uL    RBC 4.86 4.10 - 5.70 M/uL    HGB 15.3 12.1 - 17.0 g/dL    HCT 44.3 36.6 - 50.3 %    MCV 91.2 80.0 - 99.0 FL    MCH 31.5 26.0 - 34.0 PG    MCHC 34.5 30.0 - 36.5 g/dL    RDW 12.5 11.5 - 14.5 %    PLATELET 738 136 - 400 K/uL    MPV 10.2 8.9 - 12.9 FL    NRBC 0.0 0  WBC    ABSOLUTE NRBC 0.00 0.00 - 0.01 K/uL    NEUTROPHILS 76 (H) 32 - 75 %    LYMPHOCYTES 20 12 - 49 %    MONOCYTES 3 (L) 5 - 13 %    EOSINOPHILS 0 0 - 7 %    BASOPHILS 0 0 - 1 %    IMMATURE GRANULOCYTES 0 0.0 - 0.5 %    ABS. NEUTROPHILS 10.3 (H) 1.8 - 8.0 K/UL    ABS. LYMPHOCYTES 2.7 0.8 - 3.5 K/UL    ABS. MONOCYTES 0.4 0.0 - 1.0 K/UL    ABS. EOSINOPHILS 0.0 0.0 - 0.4 K/UL    ABS. BASOPHILS 0.1 0.0 - 0.1 K/UL    ABS. IMM.  GRANS. 0.1 (H) 0.00 - 0.04 K/UL    DF AUTOMATED     METABOLIC PANEL, COMPREHENSIVE    Collection Time: 05/28/18  8:47 PM   Result Value Ref Range    Sodium 135 (L) 136 - 145 mmol/L    Potassium 5.2 (H) 3.5 - 5.1 mmol/L    Chloride 98 97 - 108 mmol/L    CO2 18 (L) 21 - 32 mmol/L    Anion gap 19 (H) 5 - 15 mmol/L    Glucose 580 (H) 65 - 100 mg/dL    BUN 22 (H) 6 - 20 MG/DL    Creatinine 1.60 (H) 0.70 - 1.30 MG/DL    BUN/Creatinine ratio 14 12 - 20      GFR est AA 54 (L) >60 ml/min/1.73m2    GFR est non-AA 45 (L) >60 ml/min/1.73m2    Calcium 10.7 (H) 8.5 - 10.1 MG/DL    Bilirubin, total 0.5 0.2 - 1.0 MG/DL    ALT (SGPT) 30 12 - 78 U/L    AST (SGOT) 10 (L) 15 - 37 U/L    Alk.  phosphatase 116 45 - 117 U/L    Protein, total 8.9 (H) 6.4 - 8.2 g/dL    Albumin 4.3 3.5 - 5.0 g/dL    Globulin 4.6 (H) 2.0 - 4.0 g/dL    A-G Ratio 0.9 (L) 1.1 - 2.2     MAGNESIUM    Collection Time: 05/28/18  8:47 PM   Result Value Ref Range    Magnesium 2.5 (H) 1.6 - 2.4 mg/dL   LIPASE    Collection Time: 05/28/18  8:47 PM   Result Value Ref Range    Lipase 108 73 - 393 U/L   LACTIC ACID    Collection Time: 05/28/18  8:47 PM   Result Value Ref Range    Lactic acid 3.1 (HH) 0.4 - 2.0 MMOL/L   URINALYSIS W/ REFLEX CULTURE    Collection Time: 05/28/18  9:06 PM   Result Value Ref Range    Color YELLOW/STRAW      Appearance CLEAR CLEAR      Specific gravity 1.020 1.003 - 1.030      pH (UA) 5.0 5.0 - 8.0      Protein NEGATIVE  NEG mg/dL    Glucose >1000 (A) NEG mg/dL    Ketone 80 (A) NEG mg/dL    Bilirubin NEGATIVE  NEG      Blood NEGATIVE  NEG      Urobilinogen 0.2 0.2 - 1.0 EU/dL    Nitrites NEGATIVE  NEG      Leukocyte Esterase NEGATIVE  NEG      UA:UC IF INDICATED CULTURE NOT INDICATED BY UA RESULT CNI      WBC 0-4 0 - 4 /hpf    RBC 0-5 0 - 5 /hpf    Epithelial cells FEW FEW /lpf    Bacteria NEGATIVE  NEG /hpf    Hyaline cast 0-2 0 - 5 /lpf   METABOLIC PANEL, COMPREHENSIVE    Collection Time: 05/28/18 10:11 PM   Result Value Ref Range    Sodium 137 136 - 145 mmol/L    Potassium 4.5 3.5 - 5.1 mmol/L    Chloride 102 97 - 108 mmol/L    CO2 20 (L) 21 - 32 mmol/L    Anion gap 15 5 - 15 mmol/L    Glucose 551 (H) 65 - 100 mg/dL    BUN 21 (H) 6 - 20 MG/DL    Creatinine 1.53 (H) 0.70 - 1.30 MG/DL    BUN/Creatinine ratio 14 12 - 20      GFR est AA 57 (L) >60 ml/min/1.73m2    GFR est non-AA 47 (L) >60 ml/min/1.73m2    Calcium 9.6 8.5 - 10.1 MG/DL    Bilirubin, total 0.4 0.2 - 1.0 MG/DL    ALT (SGPT) 28 12 - 78 U/L    AST (SGOT) 6 (L) 15 - 37 U/L Alk. phosphatase 102 45 - 117 U/L    Protein, total 7.9 6.4 - 8.2 g/dL    Albumin 3.8 3.5 - 5.0 g/dL    Globulin 4.1 (H) 2.0 - 4.0 g/dL    A-G Ratio 0.9 (L) 1.1 - 2.2     GLUCOSE, POC    Collection Time: 05/28/18 10:13 PM   Result Value Ref Range    Glucose (POC) 530 (H) 65 - 100 mg/dL    Performed by Jhony Madrigal   I am the first provider for this patient. I reviewed the vital signs, available nursing notes, past medical history, past surgical history, family history and social history. Vital Signs-Reviewed the patient's vital signs. Patient Vitals for the past 12 hrs:   Temp Pulse Resp BP SpO2   05/28/18 2130 - 72 18 142/86 98 %   05/28/18 2100 - 75 22 (!) 155/99 96 %   05/28/18 2045 - 70 14 (!) 140/92 100 %   05/28/18 2013 97.7 °F (36.5 °C) 79 18 (!) 153/96 98 %       Pulse Oximetry Analysis - 98% on RA    Cardiac Monitor:   Rate: 78 bpm  Rhythm: Normal Sinus Rhythm      Records Reviewed: Nursing Notes, Old Medical Records, Previous electrocardiograms, Previous Radiology Studies and Previous Laboratory Studies    Provider Notes (Medical Decision Making):   DDx: hyperglycemia, DKA, electrolyte disturbance, UTI    Pt is a 65 y/o male hx of DM presents with weakness, nausea, vomiting, and elevated blood sugar. Will check labs to r/o DKA. Will give IV fluids. Blood glucose here greater than 500. Will give insulin and reassess. CT imaging not indicated at this time. ED Course:   Initial assessment performed. The patients presenting problems have been discussed, and they are in agreement with the care plan formulated and outlined with them. I have encouraged them to ask questions as they arise throughout their visit. PROGRESS NOTE:  10:05 PM  Labs reviewed, pt has a WBC of 13.5, blood glucose of 580, potassium 5.2, anion gap of 19, and a lactate of 3.1. Given above will start insulin drip for DKA. Will consult with hospitalist for admission.   Written by Felicita Brandt, HEIDI Scribe, as dictated by Salvador Ruiz MD.    10:30PM  Insulin drip started for increased AG and elevated lactate. Pt also has acute on chronic kidney injury. Will need admission and high level of care. CONSULT NOTE:   11:06 PM  Salvador Ruiz MD spoke with Octavio Adams MD   Specialty: Hospitalist  Discussed pt's hx, disposition, and available diagnostic and imaging results. Reviewed care plans. Consultant will evaluate pt for admission.   Written by Pardeep Merino, HEIDI Scribe, as dictated by Salvador Ruiz MD.    Medications   sodium chloride (NS) flush 5-10 mL ( IntraVENous Canceled Entry 5/28/18 2346)   sodium chloride (NS) flush 5-10 mL (not administered)   insulin regular (NOVOLIN R, HUMULIN R) 100 Units in 0.9% sodium chloride 100 mL infusion (15.1 Units/hr IntraVENous Rate Change 5/29/18 0320)   insulin lispro (HUMALOG) injection (not administered)   glucose chewable tablet 16 g (not administered)   dextrose (D50W) injection syrg 12.5-25 g (not administered)   glucagon (GLUCAGEN) injection 1 mg (not administered)   acetaminophen (TYLENOL) tablet 650 mg (not administered)   ondansetron (ZOFRAN) injection 4 mg (not administered)   enoxaparin (LOVENOX) injection 40 mg (not administered)   0.9% sodium chloride infusion (125 mL/hr IntraVENous Continued On Admission 5/29/18 0015)   sodium chloride 0.9 % bolus infusion 1,000 mL (0 mL IntraVENous IV Completed 5/28/18 2157)   ondansetron (ZOFRAN) injection 8 mg (8 mg IntraVENous Given 5/28/18 2103)   insulin regular (NOVOLIN R, HUMULIN R) injection 10 Units (10 Units IntraVENous Given 5/28/18 2103)       CRITICAL CARE NOTE :    10:06 PM    IMPENDING DETERIORATION -Metabolic, Renal and Hepatic, Endocrine  ASSOCIATED RISK FACTORS - Hypotension, Dysrhythmia, Metabolic changes, Dehydration and Vascular Compromise  MANAGEMENT- Bedside Assessment and Supervision of Care  INTERPRETATION -  Xrays, Blood Gases, ECG, Blood Pressure and Cardiac Output Measures   INTERVENTIONS - hemodynamic mngmt, vascular control, Metobolic interventions and DKA management with continuous insulin drip, aggressive IVF resuscitation    CASE REVIEW - Hospitalist, Nursing and Family  TREATMENT RESPONSE -Stable  PERFORMED BY - Self    NOTES   :    I have spent 72 minutes of critical care time involved in lab review, consultations with specialist, family decision- making, bedside attention and documentation. During this entire length of time I was immediately available to the patient . Critical Care: The reason for providing this level of medical care for this critically ill patient was due to a critical illness that impaired one or more vital organ systems, such that there was a high probability of imminent or life threatening deterioration in the patient's condition. This care involved high complexity decision making to assess, manipulate, and support vital system functions, to treat this degree of vital organ system failure, and to prevent further life threatening deterioration of the patients condition. Becca Headley MD    Disposition:    ADMIT NOTE:  11:06 PM  The patient is being admitted to the hospital by Guadalupe Lopez MD.  The results of their tests and reasons for their admission have been discussed with the patient and/or available family. They convey agreement and understanding for the need to be admitted and for their admission diagnosis. PLAN:  1. Admit to hospitalist     Diagnosis     Clinical Impression:   1. Diabetic ketoacidosis without coma associated with type 2 diabetes mellitus ([address]    2. Acute hyperkalemia    3. Weight loss    4. Non-intractable vomiting with nausea, unspecified vomiting type    5. Acute kidney injury ([address]    6. Lactic acid increased    7. Accelerated hypertension    8. Uncontrolled type 2 diabetes mellitus with hyperglycemia, with long-term current use of insulin ([address]        Attestations:     This note is prepared by Bob Guerrero, acting as Scribe for Becca Headley MD.    Chris Shoemaker MD: The scribe's documentation has been prepared under my direction and personally reviewed by me in its entirety. I confirm that the note above accurately reflects all work, treatment, procedures, and medical decision making performed by me. This note will not be viewable in [address]

## 2018-05-29 NOTE — PROGRESS NOTES
Reason for Admission:   Patient came to ed with episodes of nausea, vomiting and weakness. He also noted polyuria and polydipsia. He is from [place] and was visiting here for the holidays. Patient has md at the [facility] in [place], [place]. He works and drives. He was independent prior to coming to the hospital. He lives in one story home alone. He denies having home health or rehab services in the past.                      RRAT Score:     6                  Plan for utilizing home health:      He denies using home health in the past.                       Likelihood of Readmission:  Low                           Transition of Care Plan:      He plans to follow up with his md at the [facility] in [place] when discharged. Care Management Interventions  PCP Verified by CM:  Yes (Patient sees md at [facility] in [place], [place])  Transition of [address] ([address]): Discharge Planning  Discharge Durable Medical Equipment:  (Patient has glucometer at home. )  Current Support Network: Lives Alone  Confirm Follow Up Transport: Family  Plan discussed with Pt/Family/Caregiver: Yes  Discharge Location  Discharge Placement: Home

## 2018-05-29 NOTE — DIABETES MGMT
DTC Consult Note    Recommendations/ Comments: Pt currently on insulin gtt, currently running at 5.9 units/hr. Please do not discontinue insulin drip until BG less than 200 mg/dL and anion gap less than 12 mEq/L on two consecutive BMPs. Pt will require basal insulin 2 hours prior to discontinuing insulin gtt. Weight based basal dose is ~16 units daily (using 0.2 units/kg body weight). Current hospital DM medication: insulin gtt    Consult received for:  []             Assessment of home management                []      Medication Recommendations                []             Meter/monitoring     [x]             Insulin instruction     []             New diagnosis     [x]             Outpatient education     []             Insulin pump patient     []             Insulin infusion     []             DKA/HHS    Chart reviewed and initial evaluation complete on Brianne Bun. Patient is a 66 y.o. male with known DM on 70/30 21 units in the am 6 units in the pm. Pt shared that he just started taking the 70/30 insulin last month. Pt shared that he took insulin before and discontinued his insulin as his BG were controlled and his hgb A1c was \"5 percent\"- He discontinued his insulin approximately 2 years ago. Pt reports that he may miss taking his insulin now 2-4x/weekly (miss taking insulin when is he \"feeling good\"), injects in the sides of his abdomen. Pt shared that BG was 118 - 140 mg/dL at home when he was on insulin 2 years ago. Pt shared that he isn't sure if his meter is working now. Pt shared that he does not have insurance and receives his care from a [facility] in [place] (where he resides). Pt also shared that his brother helps to pay for his supplies. Pt eats 2 eggs, oatmeal, 2 pieces of toast with breakfast, lunch is fast food food, dinner is \"home cooked meal\". Pt may have 8-16 oz of juice daily.        Assessed and instructed patient on the following:   ·  blood sugar goals, hypoglycemia prevention and treatment, insulin adjustments - discussed importance in following up with PCP for proper medication management/DM management, illness management - discussed importance in avoiding missed doses of insulin, discussed that pt needs to continue taking insulin, nutrition - discussed avoiding sugary beverages, using balance at meals with \"MyPlate method\", referred to Diabetes Educator and site rotation - encouraged pt to migrate sites vs rotate from side to side to avoid hitting the same injection sites. Pt shared that he feels comfortable injecting his insulin as he was on insulin before    Encouraged the following:   · dietary modifications: Use balance at meals, non-starchy vegetables with all meals, avoid sugary beverages such as juices, regular soda, etc. Discussed using beverages that have 0 Total CHO on the label, regular blood sugar monitoring: at least 2x/daily    Provided patient with the following: [x]             Survival skills education materials               [x]             Insulin education materials               []             CHO counting education materials               [x]             Outpatient DTC contact number               [x]             Glucometer               Patient was able to give return demonstration of    [x]       Glucometer (provided One Touch Verio Flex meter    []       saline injection      with []     without []       assistance needed. []       Nurse to have patient self inject prior to discharge. Discussed with patient and/or family need for follow up appointment for diabetes management after discharge.       A1c:   Lab Results   Component Value Date/Time    Hemoglobin A1c 14.8 (H) 05/29/2018 02:09 AM       Recent Glucose Results:   Lab Results   Component Value Date/Time     (H) 05/29/2018 06:09 AM     (H) 05/29/2018 02:09 AM     (H) 05/28/2018 10:11 PM    GLUCPOC 183 (H) 05/29/2018 11:35 AM    GLUCPOC 250 (H) 05/29/2018 10:31 AM    GLUCPOC 375 (H) 05/29/2018 08:56 AM        Lab Results   Component Value Date/Time    Creatinine 1.25 05/29/2018 06:09 AM     Estimated Creatinine Clearance: 75 mL/min (based on Cr of 1.25). Active Orders   Diet    DIET DIABETIC WITH OPTIONS Consistent Carb 1800kcal; Regular; 2 GM NA (House Low NA)        PO intake: No data found. Will continue to follow as needed. Thank you.     Nataliia Mensah, [address]     Office: [phone]

## 2018-05-29 NOTE — ED NOTES
Pt high blood glucose, N/V, weakness and increased urination x1 month. Pt reports vomiting \"for the first time last night. \" Has been off insulin x2 years, until last month when patient put self back on insulin. Dosed self \"5cc today. \"

## 2018-05-29 NOTE — PROGRESS NOTES
PCU SHIFT NURSING NOTE      Bedside shift change report given to SUPRIYA Amaya (oncoming nurse) by Beau Urban (offgoing nurse). Report included the following information SBAR and Cardiac Rhythm NSR. Shift Summary:   2425 Patient sleeping when I entered the room, but awoke to name being called. Alert and Oriented with VS WNL. 1600 Paged Dr Leong for directions on Ernesto Fetch due to two consecutive anion gap results lower than 12    1732 Paged Dr Leong again for directions on glucostabilizer, no return call on initial call. 3998 Dr Leong called back and I gave test results for anion gap along with requesting lantus and insulin prescriptions.  stated that he would place the orders in after reviewing patients chart. [phone] I requested a new bag of insulin for the drip due to the lantus orders not being placed yet. 1906 I have been waiting on the orders for the lantus to stop the insulin drip. The orders were not placed in the system until after 1900. The pharmacy called, Arya El, because she did not not want to send the new bag of insulin since the order for lantus had now been placed. I explained to her that I had requested the bag of insulin due to the length of time it was taking to get the order for lantus and I did not want to run out prior to receiving the order. The patient would still need to remain on the insulin drip for the required amount of time after receiving the lantus. The pharmacist Ravi Patel stated that she would change the time the lantus order would start so that I could administer the lantus now. 1945 I went to pull the lantus and administer the med, but there was a stop placed back on the med by pharmacy Ravi Patel. 2055  After spending time on the phone with pharmacy, and Dr Vipin Juarez I was able to get the prescription of lantus released and administered. The unsulin drip should be stopped at 2250 tonight.               Admission Date 5/28/2018   Admission Diagnosis DKA (diabetic ketoacidoses) ([facility] [address]   Consults None        Consults   []PT   []OT   []Speech   []Case Management      [] Palliative      Cardiac Monitoring Order   []Yes   []No     IV drips   []Yes    Drip:                            Dose:  Drip:                            Dose:  Drip:                            Dose:   []No     GI Prophylaxis   []Yes   []No         DVT Prophylaxis   SCDs:             Thai stockings:         [] Medication   []Contraindicated   []None      Activity Level Activity Level: Up with Assistance     Activity Assistance: Partial (one person)   Purposeful Rounding every 1-2 hour? []Yes   George Score  Total Score: 1   Bed Alarm (If score 3 or >)   []Yes   [] Refused (See signed refusal form in chart)   Masood Score  Masood Score: 22   Masood Score (if score 14 or less)   []PMT consult   []Wound Care consult      []Specialty bed   [] Nutrition consult          Needs prior to discharge:   Home O2 required:    []Yes   []No    If yes, how much O2 required?     Other:    Last Bowel Movement:        Influenza Vaccine Received Flu Vaccine for Current Season (usually Sept-March): Not Flu Season        Pneumonia Vaccine           Diet Active Orders   Diet    DIET DIABETIC WITH OPTIONS Consistent Carb 1800kcal; Regular; 2 GM NA (House Low NA)      LDAs               Peripheral IV 05/28/18 Left Antecubital (Active)   Site Assessment Clean, dry, & intact 5/29/2018  1:09 AM   Phlebitis Assessment 0 5/29/2018  1:09 AM   Infiltration Assessment 0 5/29/2018  1:09 AM   Dressing Status Clean, dry, & intact 5/29/2018  1:09 AM   Dressing Type Tape;Transparent 5/29/2018  1:09 AM   Hub Color/Line Status Pink;Capped;Flushed 5/29/2018  1:09 AM   Action Taken Open ports on tubing capped 5/29/2018  1:09 AM   Alcohol Cap Used Yes 5/29/2018  1:09 AM                      Urinary Catheter      Intake & Output   Date 05/28/18 0700 - 05/29/18 0659 05/29/18 0700 - 05/30/18 0659   Shift 5755-6719 1571-1316 24 Hour Total 6448-3471 0516-0548 24 Hour Total   I  N  T  A  K  E   P.O.  1000 1000         P. O.  1000 1000       Shift Total  (mL/kg)  1000  (12.4) 1000  (12.4)      O  U  T  P  U  T   Urine  1200  (1.2) 1200  (0.6)         Urine Voided  1200 1200       Shift Total  (mL/kg)  1200  (14.9) 1200  (14.9)      NET  -200 -200      Weight (kg)  80.4 80.4 80.4 80.4 80.4         Readmission Risk Assessment Tool Score Low Risk            6       Total Score        4 Pt. Coverage (Medicare=5 , Medicaid, or Self-Pay=4)    2 Charlson Comorbidity Score (Age + Comorbid Conditions)        Criteria that do not apply:    Has Seen PCP in Last 6 Months (Yes=3, No=0)    . Living with Significant Other. Assisted Living. LTAC. SNF. or   Rehab    Patient Length of Stay (>5 days = 3)    IP Visits Last 12 Months (1-3=4, 4=9, >4=11)       Expected Length of Stay - - -   Actual Length of Stay 1              .

## 2018-05-29 NOTE — PROGRESS NOTES
Problem: Falls - Risk of  Goal: *Absence of Falls  Document Cherry Fall Risk and appropriate interventions in the flowsheet.    Outcome: Progressing Towards Goal  Fall Risk Interventions:            Medication Interventions: Bed/chair exit alarm, Assess postural VS orthostatic hypotension

## 2018-05-29 NOTE — H&P
Hospitalist Admission Note    NAME: Radha Carrillo   :  1960   MRN:  [id number]     Date/Time:  2018 11:44 PM    Patient PCP: None  ______________________________________________________________________  Given the patient's current clinical presentation, I have a high level of concern for decompensation if discharged from the emergency department. Complex decision making was performed, which includes reviewing the patient's available past medical records, laboratory results, and x-ray films. My assessment of this patient's clinical condition and my plan of care is as follows. Assessment / Plan:  Type II Diabetes with DKA  Hyperkalemia: improving  TANIKA versus CKD  Elevated lactic Acid  Leukocytosis: suspect reactive  -admit to Stepdown  -insulin gtt  -IVF's with NS at 125 mL/hr for now  -monitor renal function and lactate; q4h BMP's  -monitor Leukocytosis  -get CXR for completeness  -diabetic education  -IV antiemetics  -check A1c as well as TFT's and FLP for good measure since patient has not been evaluated by an MD in the last 2 years. Elevated Blood pressure: patient deneis PMH of hypertension  -monitor and if remains elevated then start antihypertensive    Unintentional Weight Loss from poorly controlled DM: reports 20 lbs lost in last month    Code Status: Full  Surrogate Decision Maker: Brother    DVT Prophylaxis: Lovenox  GI Prophylaxis: not indicated    Baseline: Functional      Subjective:   CHIEF COMPLAINT: weakness    HISTORY OF PRESENT ILLNESS:     Radha Carrillo is a 66 y.o.  male who presents with DKA. Patient reports taht his PCP stopped his insulin 2 years ago. He stopped to going to the doctor after this. About 1 month ago he had concerns that he again needed insulin and obtained OTC 70/30 and was taking 20 units in the am and 5 units at night. Patient reports taht his blood glucose measurements were okay at home.  [facility] [address] [facility] for weakness. Patient reports vomiting, weight loss of 20 lbs in the last month and polydipsia/polyuria. In the ED patient' BMP showed glucose of 580 with anion gap and hyperkalemia. We were asked to admit for work up and evaluation of the above problems. Past Medical History:   Diagnosis Date    Diabetes [facility])         Social History   Substance Use Topics    Smoking status: Never Smoker    Smokeless tobacco: Never Used    Alcohol use No       Family History   Problem Relation Age of Onset    Family history significant for HTN, denies FH of DM     No Known Allergies     Prior to Admission medications    Medication Sig Start Date End Date Taking? Authorizing Provider   insulin aspart protamine/insulin aspart (NOVOLOG MIX 70-30 U-100 INSULN) 100 unit/mL (70-30) injection by SubCUTAneous route.    Yes Phys Other, MD       REVIEW OF SYSTEMS:     Total of 12 systems reviewed as follows:       POSITIVE= underlined text  Negative = text not underlined  General:  fever, chills, sweats, generalized weakness, weight loss/gain,      loss of appetite   Eyes:    blurred vision, eye pain, loss of vision, double vision  ENT:    rhinorrhea, pharyngitis   Respiratory:   cough, sputum production, SOB, CAZARES, wheezing, pleuritic pain   Cardiology:   chest pain, palpitations, orthopnea, PND, edema, syncope   Gastrointestinal:  abdominal pain , N/V, diarrhea, dysphagia, constipation, bleeding   Genitourinary:  frequency, urgency, dysuria, hematuria, incontinence   Muskuloskeletal :  arthralgia, myalgia, back pain  Hematology:  easy bruising, nose or gum bleeding, lymphadenopathy   Dermatological: rash, ulceration, pruritis, color change / jaundice  Endocrine:   hot flashes or polydipsia   Neurological:  headache, dizziness, confusion, focal weakness, paresthesia,     Speech difficulties, memory loss, gait difficulty  Psychological: Feelings of anxiety, depression, agitation    Objective:   VITALS:    Visit Vitals  /86    Pulse 72    Temp 97.7 °F (36.5 °C)    Resp 18    Ht 6' 2\" (1.88 m)    Wt 80.4 kg (177 lb 4 oz)    SpO2 98%    BMI 22.76 kg/m2       PHYSICAL EXAM:    General:    Alert, cooperative, no distress, appears stated age. HEENT: Atraumatic, anicteric sclerae, pink conjunctivae     No oral ulcers, mucosa dry, throat clear, dentition fair  Neck:  Supple, symmetrical,  Thyroid not enlarged  Lungs:   Clear to auscultation bilaterally. No Wheezing or Rhonchi. No rales. Chest wall:  No tenderness  No Accessory muscle use. Heart:   Regular  rhythm,  No  murmur   No edema  Abdomen:   Soft, non-tender. Not distended. Bowel sounds present  Extremities: No cyanosis. No clubbing,      Skin turgor normal, Capillary refill normal, Radial dial pulse 2+  Skin:     Not pale. Not Jaundiced  No rashes   Psych:  Fair insight. Not depressed. Not anxious or agitated. Neurologic: EOMs intact. No facial asymmetry. No aphasia or slurred speech. Symmetrical strength, Sensation grossly intact.  Alert and oriented X 4.     _______________________________________________________________________  Care Plan discussed with:    Comments   Patient x    Family  x Brother at bedside   RN     Care Manager                    Consultant:      _______________________________________________________________________  Expected  Disposition:   Home with Family x   HH/PT/OT/RN    SNF/LTC    KAUSHIK    ________________________________________________________________________  TOTAL TIME:  48 Minutes    Critical Care Provided     Minutes non procedure based      Comments    x Reviewed previous records   >50% of visit spent in counseling and coordination of care x Discussion with patient and/or family and questions answered       ________________________________________________________________________  Signed: Geraldine Leigh MD    Procedures: see electronic medical records for all procedures/Xrays and details which were not copied into this note but were reviewed prior to creation of Plan. LAB DATA REVIEWED:    Recent Results (from the past 24 hour(s))   GLUCOSE, POC    Collection Time: 05/28/18  8:35 PM   Result Value Ref Range    Glucose (POC) 560 (H) 65 - 100 mg/dL    Performed by Devante Faith    CBC WITH AUTOMATED DIFF    Collection Time: 05/28/18  8:47 PM   Result Value Ref Range    WBC 13.5 (H) 4.1 - 11.1 K/uL    RBC 4.86 4.10 - 5.70 M/uL    HGB 15.3 12.1 - 17.0 g/dL    HCT 44.3 36.6 - 50.3 %    MCV 91.2 80.0 - 99.0 FL    MCH 31.5 26.0 - 34.0 PG    MCHC 34.5 30.0 - 36.5 g/dL    RDW 12.5 11.5 - 14.5 %    PLATELET 920 289 - 093 K/uL    MPV 10.2 8.9 - 12.9 FL    NRBC 0.0 0  WBC    ABSOLUTE NRBC 0.00 0.00 - 0.01 K/uL    NEUTROPHILS 76 (H) 32 - 75 %    LYMPHOCYTES 20 12 - 49 %    MONOCYTES 3 (L) 5 - 13 %    EOSINOPHILS 0 0 - 7 %    BASOPHILS 0 0 - 1 %    IMMATURE GRANULOCYTES 0 0.0 - 0.5 %    ABS. NEUTROPHILS 10.3 (H) 1.8 - 8.0 K/UL    ABS. LYMPHOCYTES 2.7 0.8 - 3.5 K/UL    ABS. MONOCYTES 0.4 0.0 - 1.0 K/UL    ABS. EOSINOPHILS 0.0 0.0 - 0.4 K/UL    ABS. BASOPHILS 0.1 0.0 - 0.1 K/UL    ABS. IMM. GRANS. 0.1 (H) 0.00 - 0.04 K/UL    DF AUTOMATED     METABOLIC PANEL, COMPREHENSIVE    Collection Time: 05/28/18  8:47 PM   Result Value Ref Range    Sodium 135 (L) 136 - 145 mmol/L    Potassium 5.2 (H) 3.5 - 5.1 mmol/L    Chloride 98 97 - 108 mmol/L    CO2 18 (L) 21 - 32 mmol/L    Anion gap 19 (H) 5 - 15 mmol/L    Glucose 580 (H) 65 - 100 mg/dL    BUN 22 (H) 6 - 20 MG/DL    Creatinine 1.60 (H) 0.70 - 1.30 MG/DL    BUN/Creatinine ratio 14 12 - 20      GFR est AA 54 (L) >60 ml/min/1.73m2    GFR est non-AA 45 (L) >60 ml/min/1.73m2    Calcium 10.7 (H) 8.5 - 10.1 MG/DL    Bilirubin, total 0.5 0.2 - 1.0 MG/DL    ALT (SGPT) 30 12 - 78 U/L    AST (SGOT) 10 (L) 15 - 37 U/L    Alk.  phosphatase 116 45 - 117 U/L    Protein, total 8.9 (H) 6.4 - 8.2 g/dL    Albumin 4.3 3.5 - 5.0 g/dL    Globulin 4.6 (H) 2.0 - 4.0 g/dL    A-G Ratio 0.9 (L) 1.1 - 2.2 MAGNESIUM    Collection Time: 05/28/18  8:47 PM   Result Value Ref Range    Magnesium 2.5 (H) 1.6 - 2.4 mg/dL   LIPASE    Collection Time: 05/28/18  8:47 PM   Result Value Ref Range    Lipase 108 73 - 393 U/L   LACTIC ACID    Collection Time: 05/28/18  8:47 PM   Result Value Ref Range    Lactic acid 3.1 (HH) 0.4 - 2.0 MMOL/L   URINALYSIS W/ REFLEX CULTURE    Collection Time: 05/28/18  9:06 PM   Result Value Ref Range    Color YELLOW/STRAW      Appearance CLEAR CLEAR      Specific gravity 1.020 1.003 - 1.030      pH (UA) 5.0 5.0 - 8.0      Protein NEGATIVE  NEG mg/dL    Glucose >1000 (A) NEG mg/dL    Ketone 80 (A) NEG mg/dL    Bilirubin NEGATIVE  NEG      Blood NEGATIVE  NEG      Urobilinogen 0.2 0.2 - 1.0 EU/dL    Nitrites NEGATIVE  NEG      Leukocyte Esterase NEGATIVE  NEG      UA:UC IF INDICATED CULTURE NOT INDICATED BY UA RESULT CNI      WBC 0-4 0 - 4 /hpf    RBC 0-5 0 - 5 /hpf    Epithelial cells FEW FEW /lpf    Bacteria NEGATIVE  NEG /hpf    Hyaline cast 0-2 0 - 5 /lpf   METABOLIC PANEL, COMPREHENSIVE    Collection Time: 05/28/18 10:11 PM   Result Value Ref Range    Sodium 137 136 - 145 mmol/L    Potassium 4.5 3.5 - 5.1 mmol/L    Chloride 102 97 - 108 mmol/L    CO2 20 (L) 21 - 32 mmol/L    Anion gap 15 5 - 15 mmol/L    Glucose 551 (H) 65 - 100 mg/dL    BUN 21 (H) 6 - 20 MG/DL    Creatinine 1.53 (H) 0.70 - 1.30 MG/DL    BUN/Creatinine ratio 14 12 - 20      GFR est AA 57 (L) >60 ml/min/1.73m2    GFR est non-AA 47 (L) >60 ml/min/1.73m2    Calcium 9.6 8.5 - 10.1 MG/DL    Bilirubin, total 0.4 0.2 - 1.0 MG/DL    ALT (SGPT) 28 12 - 78 U/L    AST (SGOT) 6 (L) 15 - 37 U/L    Alk.  phosphatase 102 45 - 117 U/L    Protein, total 7.9 6.4 - 8.2 g/dL    Albumin 3.8 3.5 - 5.0 g/dL    Globulin 4.1 (H) 2.0 - 4.0 g/dL    A-G Ratio 0.9 (L) 1.1 - 2.2     GLUCOSE, POC    Collection Time: 05/28/18 10:13 PM   Result Value Ref Range    Glucose (POC) 530 (H) 65 - 100 mg/dL    Performed by Darrell Newell, POC    Collection Time: 05/28/18 11:40 PM   Result Value Ref Range    Glucose (POC) 561 (H) 65 - 100 mg/dL    Performed by Allyson Patel

## 2018-05-30 LAB
ANION GAP SERPL CALC-SCNC: 10 MMOL/L (ref 5–15)
BASOPHILS # BLD: 0.1 K/UL (ref 0–0.1)
BASOPHILS NFR BLD: 0 % (ref 0–1)
BUN SERPL-MCNC: 16 MG/DL (ref 6–20)
BUN/CREAT SERPL: 16 (ref 12–20)
CALCIUM SERPL-MCNC: 8.6 MG/DL (ref 8.5–10.1)
CHLORIDE SERPL-SCNC: 104 MMOL/L (ref 97–108)
CO2 SERPL-SCNC: 22 MMOL/L (ref 21–32)
CREAT SERPL-MCNC: 0.97 MG/DL (ref 0.7–1.3)
DIFFERENTIAL METHOD BLD: ABNORMAL
EOSINOPHIL # BLD: 0.1 K/UL (ref 0–0.4)
EOSINOPHIL NFR BLD: 1 % (ref 0–7)
ERYTHROCYTE [DISTWIDTH] IN BLOOD BY AUTOMATED COUNT: 12.3 % (ref 11.5–14.5)
GLUCOSE BLD STRIP.AUTO-MCNC: 286 MG/DL (ref 65–100)
GLUCOSE BLD STRIP.AUTO-MCNC: 311 MG/DL (ref 65–100)
GLUCOSE BLD STRIP.AUTO-MCNC: 324 MG/DL (ref 65–100)
GLUCOSE BLD STRIP.AUTO-MCNC: 341 MG/DL (ref 65–100)
GLUCOSE BLD STRIP.AUTO-MCNC: 418 MG/DL (ref 65–100)
GLUCOSE SERPL-MCNC: 295 MG/DL (ref 65–100)
HCT VFR BLD AUTO: 35.3 % (ref 36.6–50.3)
HGB BLD-MCNC: 12.4 G/DL (ref 12.1–17)
IMM GRANULOCYTES # BLD: 0.1 K/UL (ref 0–0.04)
IMM GRANULOCYTES NFR BLD AUTO: 1 % (ref 0–0.5)
LYMPHOCYTES # BLD: 4.6 K/UL (ref 0.8–3.5)
LYMPHOCYTES NFR BLD: 39 % (ref 12–49)
MAGNESIUM SERPL-MCNC: 1.9 MG/DL (ref 1.6–2.4)
MCH RBC QN AUTO: 31.2 PG (ref 26–34)
MCHC RBC AUTO-ENTMCNC: 35.1 G/DL (ref 30–36.5)
MCV RBC AUTO: 88.9 FL (ref 80–99)
MONOCYTES # BLD: 0.7 K/UL (ref 0–1)
MONOCYTES NFR BLD: 6 % (ref 5–13)
NEUTS SEG # BLD: 6.2 K/UL (ref 1.8–8)
NEUTS SEG NFR BLD: 53 % (ref 32–75)
NRBC # BLD: 0 K/UL (ref 0–0.01)
NRBC BLD-RTO: 0 PER 100 WBC
PHOSPHATE SERPL-MCNC: 2.2 MG/DL (ref 2.6–4.7)
PLATELET # BLD AUTO: 177 K/UL (ref 150–400)
PMV BLD AUTO: 9.8 FL (ref 8.9–12.9)
POTASSIUM SERPL-SCNC: 3.6 MMOL/L (ref 3.5–5.1)
RBC # BLD AUTO: 3.97 M/UL (ref 4.1–5.7)
SERVICE CMNT-IMP: ABNORMAL
SODIUM SERPL-SCNC: 136 MMOL/L (ref 136–145)
WBC # BLD AUTO: 11.7 K/UL (ref 4.1–11.1)

## 2018-05-30 PROCEDURE — 74011250637 HC RX REV CODE- 250/637: Performed by: HOSPITALIST

## 2018-05-30 PROCEDURE — 74011250636 HC RX REV CODE- 250/636: Performed by: INTERNAL MEDICINE

## 2018-05-30 PROCEDURE — 85025 COMPLETE CBC W/AUTO DIFF WBC: CPT | Performed by: INTERNAL MEDICINE

## 2018-05-30 PROCEDURE — 84100 ASSAY OF PHOSPHORUS: CPT | Performed by: INTERNAL MEDICINE

## 2018-05-30 PROCEDURE — 83735 ASSAY OF MAGNESIUM: CPT | Performed by: INTERNAL MEDICINE

## 2018-05-30 PROCEDURE — 74011636637 HC RX REV CODE- 636/637: Performed by: HOSPITALIST

## 2018-05-30 PROCEDURE — 80048 BASIC METABOLIC PNL TOTAL CA: CPT | Performed by: INTERNAL MEDICINE

## 2018-05-30 PROCEDURE — 82962 GLUCOSE BLOOD TEST: CPT

## 2018-05-30 PROCEDURE — 74011636637 HC RX REV CODE- 636/637: Performed by: INTERNAL MEDICINE

## 2018-05-30 PROCEDURE — 65270000015 HC RM PRIVATE ONCOLOGY

## 2018-05-30 PROCEDURE — 36415 COLL VENOUS BLD VENIPUNCTURE: CPT | Performed by: INTERNAL MEDICINE

## 2018-05-30 RX ORDER — INSULIN LISPRO 100 [IU]/ML
12 INJECTION, SOLUTION INTRAVENOUS; SUBCUTANEOUS ONCE
Status: COMPLETED | OUTPATIENT
Start: 2018-05-30 | End: 2018-05-30

## 2018-05-30 RX ADMIN — LISINOPRIL 5 MG: 5 TABLET ORAL at 08:18

## 2018-05-30 RX ADMIN — INSULIN LISPRO 7 UNITS: 100 INJECTION, SOLUTION INTRAVENOUS; SUBCUTANEOUS at 16:36

## 2018-05-30 RX ADMIN — INSULIN HUMAN 10 UNITS: 100 INJECTION, SUSPENSION SUBCUTANEOUS at 16:34

## 2018-05-30 RX ADMIN — Medication 10 ML: at 21:19

## 2018-05-30 RX ADMIN — INSULIN LISPRO 3 UNITS: 100 INJECTION, SOLUTION INTRAVENOUS; SUBCUTANEOUS at 21:27

## 2018-05-30 RX ADMIN — INSULIN HUMAN 4 UNITS: 100 INJECTION, SOLUTION PARENTERAL at 16:36

## 2018-05-30 RX ADMIN — Medication 10 ML: at 13:31

## 2018-05-30 RX ADMIN — INSULIN LISPRO 7 UNITS: 100 INJECTION, SOLUTION INTRAVENOUS; SUBCUTANEOUS at 08:19

## 2018-05-30 RX ADMIN — SODIUM CHLORIDE 125 ML/HR: 900 INJECTION, SOLUTION INTRAVENOUS at 16:45

## 2018-05-30 RX ADMIN — ENOXAPARIN SODIUM 40 MG: 100 INJECTION SUBCUTANEOUS at 08:19

## 2018-05-30 RX ADMIN — SODIUM CHLORIDE 125 ML/HR: 900 INJECTION, SOLUTION INTRAVENOUS at 07:52

## 2018-05-30 RX ADMIN — INSULIN LISPRO 12 UNITS: 100 INJECTION, SOLUTION INTRAVENOUS; SUBCUTANEOUS at 12:30

## 2018-05-30 NOTE — PROGRESS NOTES
PCU SHIFT NURSING NOTE      Bedside shift change report given to SUPRIYA Amaya (oncoming nurse) by Param Herr (offgoing nurse). Report included the following information SBAR and Cardiac Rhythm NSR. Shift Summary:   0740 Patient awake and alert once I entered the room. Patient watching television. VS are WNL    1200 Paged Dr Ling Kingsley in regards to patients glucose levels continuing to increase. 1 Dr Ling Kingsley called backed, I provided history on patient glucose hx. Dr informed me that she is placing new orders in for insulin. 2052 TRANSFER - OUT REPORT:    Verbal report given to Lashaun Hernández on Mika Batres  being transferred to Oncology(unit) for routine progression of care       Report consisted of patients Situation, Background, Assessment and   Recommendations(SBAR). Information from the following report(s) SBAR, Kardex, Recent Results and Cardiac Rhythm NSR was reviewed with the receiving nurse. Lines:   Peripheral IV 05/29/18 Left; Lower Forearm (Active)   Site Assessment Clean, dry, & intact 5/30/2018  3:27 PM   Phlebitis Assessment 0 5/30/2018  3:27 PM   Infiltration Assessment 0 5/30/2018  3:27 PM   Dressing Status Clean, dry, & intact 5/30/2018  3:27 PM   Dressing Type Transparent;Tape 5/30/2018  3:27 PM   Hub Color/Line Status Pink; Infusing 5/30/2018  3:27 PM   Action Taken Open ports on tubing capped 5/30/2018  3:27 PM   Alcohol Cap Used Yes 5/30/2018  3:27 PM        Opportunity for questions and clarification was provided.       Patient transported with:   Registered Nurse                   Admission Date 5/28/2018   Ad[facility] Diagnosis [facility]abetic k[facility]acidoses[facility]   Consults None        Consults   []PT   []OT   []Speech   []Case Management      [] Palliative      Cardiac Monitoring Order   []Yes   []No     IV drips   []Yes    Drip:                            Dose:  Drip:                            Dose:  Drip:                            Dose:   []No     GI Prophylaxis   []Yes   []No DVT Prophylaxis   SCDs:             Thai stockings:         [] Medication   []Contraindicated   []None      Activity Level Activity Level: Up with Assistance     Activity Assistance: Partial (one person)   Purposeful Rounding every 1-2 hour? []Yes   George Score  Total Score: 1   Bed Alarm (If score 3 or >)   []Yes   [] Refused (See signed refusal form in chart)   Masood Score  Masood Score: 23   Masood Score (if score 14 or less)   []PMT consult   []Wound Care consult      []Specialty bed   [] Nutrition consult          Needs prior to discharge:   Home O2 required:    []Yes   []No    If yes, how much O2 required? Other:    Last Bowel Movement: Last Bowel Movement Date: 05/28/18      Influenza Vaccine Received Flu Vaccine for Current Season (usually Sept-March): Not Flu Season        Pneumonia Vaccine           Diet Active Orders   Diet    DIET DIABETIC WITH OPTIONS Consistent Carb 1800kcal; Regular; 2 GM NA (House Low NA)      LDAs               Peripheral IV 05/29/18 Left; Lower Forearm (Active)   Site Assessment Clean, dry, & intact 5/29/2018 11:39 PM   Phlebitis Assessment 0 5/29/2018 11:39 PM   Infiltration Assessment 0 5/29/2018 11:39 PM   Dressing Status Clean, dry, & intact 5/29/2018 11:39 PM   Dressing Type Tape;Transparent 5/29/2018 11:39 PM   Hub Color/Line Status Pink; Infusing 5/29/2018 11:39 PM   Action Taken Open ports on tubing capped 5/29/2018 11:39 PM   Alcohol Cap Used Yes 5/29/2018 11:39 PM                      Urinary Catheter      Intake & Output   Date 05/29/18 0700 - 05/30/18 0659 05/30/18 0700 - 05/31/18 0659   Shift 6913-5313 7801-9444 24 Hour Total 0870-4761 5237-8181 24 Hour Total   I  N  T  A  K  E   P.O. 0301 704 4989         P.O. 7811 493 2227       I.V.  (mL/kg/hr)  3201.5  (3.3) 3201.5  (1.7)         Insulin Volume  274.5 274.5         Volume (0.9% sodium chloride infusion)  2927.1 2927.1       Shift Total  (mL/kg) 1380  (17.2) 3681.5  (45.8) 5061.5  (63) O  U  T  P  U  T   Urine  (mL/kg/hr) 200  (0.2) 300  (0.3) 500  (0.3) 400  400      Urine Voided 200 300 500 400  400    Shift Total  (mL/kg) 200  (2.5) 300  (3.7) 500  (6.2) 400  (5)  400  (5)   NET 1180 3381.5 4561.5 -400  -400   Weight (kg) 80.4 80.4 80.4 80.4 80.4 80.4         Readmission Risk Assessment Tool Score Low Risk            6       Total Score        4 Pt. Coverage (Medicare=5 , Medicaid, or Self-Pay=4)    2 Charlson Comorbidity Score (Age + Comorbid Conditions)        Criteria that do not apply:    Has Seen PCP in Last 6 Months (Yes=3, No=0)    . Living with Significant Other. Assisted Living. LTAC. SNF.  or   Rehab    Patient Length of Stay (>5 days = 3)    IP Visits Last 12 Months (1-3=4, 4=9, >4=11)       Expected Length of Stay 2d 21h   Actual Length of Stay 2

## 2018-05-30 NOTE — DIABETES MGMT
DTC Consult Progress Note    Recommendations/ Comments: Consult received today. DTC saw pt for previous consult on 5/29/18 . See note from 5/29/18. Noted lantus given last night at 2050 and insulin gtt stopped at 2253. D5 discontinued yesterday. Pt's  mg/dL this am. Pt required 7 units of correction this am for elevated BG. DTC will continue to follow BG for trends. If appropriate, please consider:  1. Increasing Lantus to 25 units    Current hospital DM medication: Lantus 20 units, lispro correction - normal sensitivity     Chart reviewed and initial evaluation complete on Madonna Higuera. Patient is a 66 y.o. male with known DM on 70/30 21 units in the am 6 units in the pm. Pt shared that he just started taking the 70/30 insulin last month. A1c:   Lab Results   Component Value Date/Time    Hemoglobin A1c 14.8 (H) 05/29/2018 02:09 AM       Recent Glucose Results:   Lab Results   Component Value Date/Time     (H) 05/30/2018 03:04 AM     (H) 05/29/2018 01:02 PM    GLUCPOC 341 (H) 05/30/2018 08:03 AM    GLUCPOC 324 (H) 05/30/2018 07:25 AM    GLUCPOC 127 (H) 05/29/2018 11:31 PM        Lab Results   Component Value Date/Time    Creatinine 0.97 05/30/2018 03:04 AM     Estimated Creatinine Clearance: 96.7 mL/min (based on Cr of 0.97). Active Orders   Diet    DIET DIABETIC WITH OPTIONS Consistent Carb 1800kcal; Regular; 2 GM NA (House Low NA)        PO intake:   Patient Vitals for the past 72 hrs:   % Diet Eaten   05/29/18 1734 100 %   05/29/18 1334 100 %   05/29/18 0858 100 %       Will continue to follow as needed. Thank you.     Gilberto Clements, [address]    Office: [phone]

## 2018-05-30 NOTE — PROGRESS NOTES
Hospitalist Progress Note    NAME: Meri Barrera   :  1960   MRN:  [id number]       Assessment / Plan:  DKA in setting of DM2 uncontrolled without complication and noncompliance with medical thearpy:  HgA1c 14.8, has not taken meds for his DM in >1 year  - transitioned off insulin gtt  - starting NPH + regular (gets 70/30 from free clinic at home)  - lispro sliding scale  - will consult  for assistance with 1 month of meds at home so he can get back to his free clinic in [place]  Leukocytosis of unclear etiology:   - CXR no acute process  - UA reviewed with glucose, ketones but no blood  - suspect due to DKA, con't to monitor  TANIKA: resolved  Unintentional Weight Loss from poorly controlled DM   Tiarra Louie likely due to poor DM control: completing diflucan today      Code Status: Full  Surrogate Decision Maker: Brother  DVT Prophylaxis: Lovenox     Subjective:     Chief Complaint / Reason for Physician Visit  \"I feel pretty good\". Improving. Discussed with RN events overnight. Review of Systems:  Symptom Y/N Comments  Symptom Y/N Comments   Fever/Chills n   Chest Pain n    Poor Appetite n   Edema n    Cough n   Abdominal Pain n    Sputum n   Joint Pain     SOB/CAZARES n   Pruritis/Rash     Nausea/vomit    Tolerating PT/OT     Diarrhea    Tolerating Diet     Constipation    Other       Could NOT obtain due to:      Objective:     VITALS:   Last 24hrs VS reviewed since prior progress note.  Most recent are:  Patient Vitals for the past 24 hrs:   Temp Pulse Resp BP SpO2   18 0749 98.2 °F (36.8 °C) 63 16 132/88 98 %   18 0303 97.9 °F (36.6 °C) (!) 58 14 129/79 100 %   18 2339 97.7 °F (36.5 °C) 61 15 131/79 100 %   18 1924 97.9 °F (36.6 °C) 67 16 124/72 96 %   18 1632 97.8 °F (36.6 °C) 62 16 120/80 100 %   18 1039 97.9 °F (36.6 °C) 69 16 120/78 98 %       Intake/Output Summary (Last 24 hours) at 18 1006  Last data filed at 18 0906   Gross per 24 hour Intake          5241.53 ml   Output              900 ml   Net          4341.53 ml        PHYSICAL EXAM:  General: WD, WN. Alert, cooperative, no acute distress    EENT:  EOMI. Anicteric sclerae. MMM  Resp:  CTA bilaterally, no wheezing or rales. No accessory muscle use  CV:  Regular  rhythm,  No edema  GI:  Soft, mildly distended, Non tender.  +Bowel sounds  Neurologic:  Alert and oriented X 3, normal speech,   Psych:   Fair insight. Not anxious nor agitated  Skin:  No rashes. No jaundice    Reviewed most current lab test results and cultures  YES  Reviewed most current radiology test results   YES  Review and summation of old records today    NO  Reviewed patient's current orders and MAR    YES  PMH/SH reviewed - no change compared to H&P  ________________________________________________________________________  Care Plan discussed with:    Comments   Patient x    Family      RN x    Care Manager     Consultant                        Multidiciplinary team rounds were held today with , nursing, pharmacist and clinical coordinator. Patient's plan of care was discussed; medications were reviewed and discharge planning was addressed. ________________________________________________________________________  Total NON critical care TIME:  30 Minutes    Total CRITICAL CARE TIME Spent:   Minutes non procedure based      Comments   >50% of visit spent in counseling and coordination of care x    ________________________________________________________________________  Alo Suárez MD     Procedures: see electronic medical records for all procedures/Xrays and details which were not copied into this note but were reviewed prior to creation of Plan. LABS:  I reviewed today's most current labs and imaging studies.   Pertinent labs include:  Recent Labs      05/30/18   0304  05/29/18   0209  05/28/18 2047   WBC  11.7*  14.6*  13.5*   HGB  12.4  14.3  15.3   HCT  35.3*  41.4  44.3   PLT  177  216  265 Recent Labs      05/30/18   0304  05/29/18   1302  05/29/18   0609  05/29/18   0209  05/28/18   2211  05/28/18 2047   NA  136  136  141  136  137  135*   K  3.6  3.7  3.8  3.9  4.5  5.2*   CL  104  103  110*  104  102  98   CO2  22  23  21  19*  20*  18*   GLU  295*  256*  156*  432*  551*  580*   BUN  16  17  19  19  21*  22*   CREA  0.97  1.16  1.25  1.36*  1.53*  1.60*   CA  8.6  9.0  9.9  9.3  9.6  10.7*   MG  1.9   --    --   2.3   --   2.5*   PHOS  2.2*   --    --    --    --    --    ALB   --    --    --    --   3.8  4.3   TBILI   --    --    --    --   0.4  0.5   SGOT   --    --    --    --   6*  10*   ALT   --    --    --    --   28 30       Signed: Kimberly Stapleton MD

## 2018-05-30 NOTE — PROGRESS NOTES
Bedside shift change report given to Ryan Jaramillo (oncoming nurse) by Babita Galvan RN (offgoing nurse). Report included the following information SBAR, Kardex, Intake/Output and Recent Results.

## 2018-05-30 NOTE — DIABETES MGMT
DTC Consult Note    Recommendations/ Comments: Please consider the followin. Increasing Lantus dose to 25 units at bedtime. Pt fasting BG 324mg/dL per am POC's. 2. Adding prandial insulin, would consider starting with Humalog 5 units ac tid    Current hospital DM medication:   -Lantus 20 units every bedtime  -Humalog normal sensitivity correction    DTC will continue to follow patient as needed. ____________________________    Consult received for:   []           Hospital Medication Recommendations                 [x]           Hospital Blood Glucose Management    Chart reviewed and initial evaluation complete on Brianne Bun. Patient is a 66 y.o. male with known history of  Type 2 Diabetes on Novolog Mix 70/30 at home. A1c:   Lab Results   Component Value Date/Time    Hemoglobin A1c 14.8 (H) 2018 02:09 AM       Recent Glucose Results: Lab Results   Component Value Date/Time     (H) 2018 03:04 AM     (H) 2018 01:02 PM    GLUCPOC 341 (H) 2018 08:03 AM    GLUCPOC 324 (H) 2018 07:25 AM    GLUCPOC 127 (H) 2018 11:31 PM        Lab Results   Component Value Date/Time    Creatinine 0.97 2018 03:04 AM       Active Orders   Diet    DIET DIABETIC WITH OPTIONS Consistent Carb 1800kcal; Regular; 2 GM NA (House Low NA)        PO intake: Patient Vitals for the past 72 hrs:   % Diet Eaten   18 0906 100 %   18 1734 100 %   18 1334 100 %   18 0858 100 %       Thank you.     Leanna Samuels, [address], [address]  Office: [phone]

## 2018-05-31 VITALS
HEART RATE: 57 BPM | WEIGHT: 177.25 LBS | HEIGHT: 74 IN | TEMPERATURE: 97.3 F | BODY MASS INDEX: 22.75 KG/M2 | OXYGEN SATURATION: 100 % | DIASTOLIC BLOOD PRESSURE: 71 MMHG | SYSTOLIC BLOOD PRESSURE: 120 MMHG | RESPIRATION RATE: 18 BRPM

## 2018-05-31 LAB
GLUCOSE BLD STRIP.AUTO-MCNC: 314 MG/DL (ref 65–100)
GLUCOSE BLD STRIP.AUTO-MCNC: 317 MG/DL (ref 65–100)
SERVICE CMNT-IMP: ABNORMAL
SERVICE CMNT-IMP: ABNORMAL

## 2018-05-31 PROCEDURE — 74011250636 HC RX REV CODE- 250/636: Performed by: INTERNAL MEDICINE

## 2018-05-31 PROCEDURE — 74011636637 HC RX REV CODE- 636/637: Performed by: INTERNAL MEDICINE

## 2018-05-31 PROCEDURE — 74011636637 HC RX REV CODE- 636/637: Performed by: HOSPITALIST

## 2018-05-31 PROCEDURE — 82962 GLUCOSE BLOOD TEST: CPT

## 2018-05-31 RX ORDER — BLOOD-GLUCOSE CONTROL, NORMAL
1 EACH MISCELLANEOUS 2 TIMES DAILY WITH MEALS
Qty: 60 LANCET | Refills: 0 | Status: SHIPPED | OUTPATIENT
Start: 2018-05-31

## 2018-05-31 RX ORDER — ISOPROPYL ALCOHOL 70 ML/100ML
1 SWAB TOPICAL 2 TIMES DAILY WITH MEALS
Qty: 100 PAD | Refills: 0 | Status: SHIPPED | OUTPATIENT
Start: 2018-05-31

## 2018-05-31 RX ORDER — SYRINGE-NEEDLE,INSULIN,0.5 ML 30 GX5/16"
1 SYRINGE, EMPTY DISPOSABLE MISCELLANEOUS 2 TIMES DAILY WITH MEALS
Qty: 100 SYRINGE | Refills: 0 | Status: SHIPPED | OUTPATIENT
Start: 2018-05-31 | End: 2018-06-30

## 2018-05-31 RX ADMIN — Medication 10 ML: at 06:21

## 2018-05-31 RX ADMIN — INSULIN LISPRO 7 UNITS: 100 INJECTION, SOLUTION INTRAVENOUS; SUBCUTANEOUS at 12:34

## 2018-05-31 RX ADMIN — INSULIN LISPRO 7 UNITS: 100 INJECTION, SOLUTION INTRAVENOUS; SUBCUTANEOUS at 08:15

## 2018-05-31 RX ADMIN — INSULIN HUMAN 6 UNITS: 100 INJECTION, SOLUTION PARENTERAL at 08:15

## 2018-05-31 RX ADMIN — ENOXAPARIN SODIUM 40 MG: 100 INJECTION SUBCUTANEOUS at 08:16

## 2018-05-31 RX ADMIN — INSULIN HUMAN 18 UNITS: 100 INJECTION, SUSPENSION SUBCUTANEOUS at 08:14

## 2018-05-31 NOTE — PROGRESS NOTES
Pt discharged to home, instructions reviewed with pt and copies given, pt's IV removed, opportunity for questions provided.

## 2018-05-31 NOTE — DISCHARGE SUMMARY
Hospitalist Discharge Summary     Patient ID:  Luna Upton  [id number]  66 y.o.  1960    PCP on record: None    Admit date: 5/28/2018  Discharge date and time: 5/31/2018      DISCHARGE DIAGNOSIS:  DKA in setting of DM2 uncontrolled without complication and noncompliance with medical therapy  Leukocytosis of unclear etiology  TANIKA: resolved  Unintentional Weight Loss from poorly controlled DM   Belinda Wellert likely due to poor DM control    CONSULTATIONS:  None    Excerpted HPI from H&P of Thania Hernandez MD:  Luna Upton is a 66 y.o.  male who presents with DKA. Patient reports taht his PCP stopped his insulin 2 years ago. He stopped to going to the doctor after this. About 1 month ago he had concerns that he again needed insulin and obtained OTC 70/30 and was taking 20 units in the am and 5 units at night. Patient reports taht his blood glucose measurements were okay at home. He presented to the ED at [facility] on 5/28 for weakness. Patient reports vomiting, weight loss of 20 lbs in the last month and polydipsia/polyuria.      In the ED patient' BMP showed glucose of 580 with anion gap and hyperkalemia.     We were asked to admit for work up and evaluation of the above problems. ______________________________________________________________________  DISCHARGE SUMMARY/HOSPITAL COURSE:  for full details see H&P, daily progress notes, labs, consult notes. Hospital course:  DKA in setting of DM2 uncontrolled without complication and noncompliance with medical therapy:  HgA1c 14.8, has not taken meds for his DM in >1 year. Pt was initially on insulin gtt, able to be transitioned off. He was started back on starting NPH + regular (gets 70/30 from free clinic at home) as this is what he is able to do with his job, finances and access to health care.   CM was able to arrange for 30 days of meds to be supplied so that he can be compliant until he is able to get back into his free clinic for additional meds. Leukocytosis of unclear etiology:  CXR no acute process. UA reviewed with glucose, ketones but no blood. WBC normalized on discharge (11). TANIKA: resolved  Unintentional Weight Loss from poorly controlled DM   Moreno Love likely due to poor DM control: completed diflucan while here.    _______________________________________________________________________  Patient seen and examined by me on discharge day. Pertinent Findings:  Gen: awake, appropriate, NAD  HEENT: cl esperanza, no lesions  Chest: CTA bilaterally, no crackles or wheezes  Cv: RRR, no murmur, no edema  Abd: soft, NT, ND, BS+, no mass  Neuro: CN intact  _______________________________________________________________________  DISCHARGE MEDICATIONS:   Current Discharge Medication List      START taking these medications    Details   lancets 30 gauge misc Take 1 Each by mouth two (2) times daily (with meals). Qty: 60 Lancet, Refills: 0      alcohol swabs (ALCOHOL PREP PADS) padm 1 Each by Apply Externally route two (2) times daily (with meals). Qty: 100 Pad, Refills: 0      glucose blood VI test strips (BLOOD GLUCOSE TEST) strip 1 Each by Does Not Apply route See Admin Instructions. For use twice daily with One Touch Verio Flex glucometer  Qty: 60 Strip, Refills: 0      Insulin Syringe-Needle U-100 (INSULIN SYRINGE) 1/2 mL 30 gauge x 5/16 syrg 1 Each by Does Not Apply route two (2) times daily (with meals) for 30 days. Qty: 100 Syringe, Refills: 0      Needle, Disp, 30 G 30 gauge x 1/2\" ndle 1 Each by Does Not Apply route two (2) times daily (with meals). Qty: 100 Each, Refills: 0      insulin NPH/insulin regular (NOVOLIN 70/30 U-100 INSULIN) 100 unit/mL (70-30) injection 28 Units by SubCUTAneous route Before breakfast and dinner for 30 days.   Qty: 16.8 mL, Refills: 0         STOP taking these medications       insulin aspart protamine/insulin aspart (NOVOLOG MIX 70-30 U-100 INSULN) 100 unit/mL (70-30) injection Comments:   Reason for Stopping:               My Recommended Diet, Activity, Wound Care, and follow-up labs are listed in the patient's Discharge Insturctions which I have personally completed and reviewed.     ______________________________________________________________________    Risk of deterioration: Moderate    Condition at Discharge:  Stable  ______________________________________________________________________    Disposition  Home with family, no needs  ______________________________________________________________________    Care Plan discussed with:   Patient, RN, Care Manager    ______________________________________________________________________    Code Status: Full Code  ______________________________________________________________________      Follow up with:   PCP : None  Follow-up Information     Follow up With Details Comments Contact Info    None   None (395) Patient stated that they have no PCP                Total time in minutes spent coordinating this discharge (includes going over instructions, follow-up, prescriptions, and preparing report for sign off to her PCP) :  35 minutes    Signed:  Rasheed Guo MD

## 2018-05-31 NOTE — DIABETES MGMT
DTC ProgressNote    Recommendations/ Comments: Noted pt discharging today. Verbal consult received from pt's nurse to demonstrate insulin instruction using insulin pens. DTC visited pt this am and demonstrated use of insulin pen. Pt returned demonstration using demo insulin pen with minimal assistance. Pt demonstrated use of insulin injecting using vial and syringe as well. Pt reported, again, that he does not have insurance. Page Dr. Kelly Sanchez regarding pt's ability to access insulin pens due to cost. Dr. Kelly Sanchez called DTC back - pt is receiving 30 days worth of medications paid by [facility] [employer]. Dr. Kelly Sanchez is aware that pt will resume vial and syringe once returning to his primary care clinic. Current hospital DM medication:   -NPH 18 units BID, regular insulin 6 units with breakfast and dinner.   -Humalog normal sensitivity correction    DTC will continue to follow patient as needed. Chart reviewed and initial evaluation complete on Yany Thurston. Patient is a 66 y.o. male with known history of  Type 2 Diabetes on Novolog Mix 70/30 at home. A1c:   Lab Results   Component Value Date/Time    Hemoglobin A1c 14.8 (H) 05/29/2018 02:09 AM       Recent Glucose Results:   Lab Results   Component Value Date/Time    GLUCPOC 317 (H) 05/31/2018 11:26 AM    GLUCPOC 314 (H) 05/31/2018 07:58 AM    GLUCPOC 286 (H) 05/30/2018 09:21 PM        Lab Results   Component Value Date/Time    Creatinine 0.97 05/30/2018 03:04 AM       Active Orders   Diet    DIET DIABETIC WITH OPTIONS Consistent Carb 2400kcal; Regular        PO intake:   Patient Vitals for the past 72 hrs:   % Diet Eaten   05/30/18 1645 100 %   05/30/18 1401 100 %   05/30/18 0906 100 %   05/29/18 1734 100 %   05/29/18 1334 100 %   05/29/18 0858 100 %       Thank you.     Harley Saucedo, [address]  Office: [phone]

## 2018-05-31 NOTE — DIABETES MGMT
DTC Consult Note    Recommendations/ Comments: Please consider the following: Noted Lantus discontinued and NPH and Regular insulin started. Pt will likely benefit from discontinuing pre-mixed insulin at d/c and using NPH every 12 hours and Regular insulin ac tid 30 minutes prior to meals Pt current regimen is only providing minimal prandial coverage at lunch time. Given pt elevated A1c, would likely benefit from an additional prandial insulin injection at lunch. Current hospital DM medication:   -Lantus 20 units every bedtime  -Humalog normal sensitivity correction    DTC will continue to follow patient as needed. ____________________________    Consult received for:   []           Hospital Medication Recommendations                 [x]           Hospital Blood Glucose Management    Chart reviewed and initial evaluation complete on Madonna Higuera. Patient is a 66 y.o. male with known history of  Type 2 Diabetes on Novolog Mix 70/30 at home. A1c:   Lab Results   Component Value Date/Time    Hemoglobin A1c 14.8 (H) 05/29/2018 02:09 AM       Recent Glucose Results:   Lab Results   Component Value Date/Time    GLUCPOC 314 (H) 05/31/2018 07:58 AM    GLUCPOC 286 (H) 05/30/2018 09:21 PM    GLUCPOC 311 (H) 05/30/2018 04:11 PM        Lab Results   Component Value Date/Time    Creatinine 0.97 05/30/2018 03:04 AM       Active Orders   Diet    DIET DIABETIC WITH OPTIONS Consistent Carb 2400kcal; Regular        PO intake:   Patient Vitals for the past 72 hrs:   % Diet Eaten   05/30/18 1645 100 %   05/30/18 1401 100 %   05/30/18 0906 100 %   05/29/18 1734 100 %   05/29/18 1334 100 %   05/29/18 0858 100 %       Thank you.     Shen Cardenas, [address] [place] [address], [address]  Office: [phone]

## 2018-05-31 NOTE — DISCHARGE INSTRUCTIONS
HOSPITALIST DISCHARGE INSTRUCTIONS    NAME: Sherry Velez   :  1960   MRN:  [id number]     Date/Time:  2018 10:04 AM    ADMIT DATE: 2018   DISCHARGE DATE: 2018     Attending Physician: Lola Almodovar MD    DISCHARGE DIAGNOSIS:  Diabetic ketoacidosis  Unintentional seight loss from poorly controlled DM   Thrush due to uncontrolled diabetes    MEDICATIONS:  See above    · It is important that you take the medication exactly as they are prescribed. · Keep your medication in the bottles provided by the pharmacist and keep a list of the medication names, dosages, and times to be taken in your wallet. · Do not take other medications without consulting your doctor. Pain Management: per above medications    What to do at Home    Recommended diet:  Diabetic Diet    Recommended activity: Activity as tolerated    If you have questions regarding the hospital related prescriptions or hospital related issues please call [facility] at . You can always direct your questions to your primary care doctor if you are unable to reach your hospital physician; your PCP works as an extension of your hospital doctor just like your hospital doctor is an extension of your PCP for your [facility] [address] [facility]. If you experience any of the following symptoms then please call your primary care physician or return to the emergency room if you cannot get hold of your doctor:  Fever, chills, nausea, vomiting, diarrhea, change in mentation, falling, bleeding, shortness of breath    Additional Instructions:      Bring these papers with you to your follow up appointments. The papers will help your doctors be sure to continue the care plan from the hospital.              Information obtained by :  I understand that if any problems occur once I am at home I am to contact my physician. I understand and acknowledge receipt of the instructions indicated above. Physician's or R.N.'s Signature                                                                  Date/Time                                                                                                                                              Patient or Representative Signature                                                          Date/Time       Diabetic Ketoacidosis (DKA): Care Instructions  Your Care Instructions  Diabetic ketoacidosis (DKA) happens when the body does not have enough insulin and can't get the sugar it needs for energy. When the body can't use sugar for energy, it starts to use fat for energy. This process makes fatty acids called ketones. The ketones build up in the blood and change the chemical balance in your body. This problem can be very dangerous and needs to be treated. Without treatment, it can lead to a coma or death. DKA occurs most often in people with type 1 diabetes. But people with type 2 diabetes also can get it. DKA can be caused by many things. It can happen if you don't take enough insulin. It can also happen if you have an infection or illness like the flu. Sometimes it happens if you are very dehydrated. DKA can only be treated with insulin and fluids. These are often given in a vein (IV). Follow-up care is a key part of your treatment and safety. Be sure to make and go to all appointments, and call your doctor if you are having problems. It's also a good idea to know your test results and keep a list of the medicines you take. How can you care for yourself at home? To reduce your chance of ketoacidosis:  · Take your insulin and other diabetes medicines on time and in the right dose. ¨ If an infection caused your DKA and your doctor prescribed antibiotics, take them as directed. Do not stop taking them just because you feel better.  You need to take the full course of antibiotics. · Test your blood sugar before meals and at bedtime or as often as your doctor advises. This is the best way to know when your blood sugar is high so you can treat it early. Watching for symptoms is not as helpful. This is because you may not have symptoms until your blood sugar is very high. Or you may not notice them. · Teach others at work and at home how to check your blood sugar. Make sure that someone else knows how do it in case you can't. · Wear or carry medical identification at all times. This is very important in case you are too sick or injured to speak for yourself. · Talk to your doctor about when you can start to exercise again. · Eat regular meals that spread your calories and carbohydrate throughout the day. This will help keep your blood sugar steady. · When you are sick:  ¨ Take your insulin and diabetes medicines. This is important even if you are vomiting and having trouble eating or drinking. Your blood sugar may go up because you are sick. If you are eating less than normal, you may need to change your dose of insulin. Talk with your doctor about a plan when you are well. Then you will know what to do when you are sick. ¨ Drink extra fluids to prevent dehydration. These include water, broth, and sugar-free drinks. If you don't drink enough, the insulin from your shot may not get into your blood. So your blood sugar may go up. ¨ Try to eat as you normally do, with a focus on healthy food choices. ¨ Check your blood sugar at least every 3 to 4 hours. Check it more often if it's rising fast. If your doctor has told you to take an extra insulin dose for high blood sugar levels (for example, above 240 mg/dL) be sure to take the right amount. If you're not sure how much to take, call your doctor. ¨ Check your temperature and pulse often. If your temperature goes up, call your doctor. You may be getting worse.   ¨ If you take insulin, check your urine or blood for ketones, especially when you have high blood sugar (for example, above 240 mg/dL). Call your doctor if your ketone level is moderate or high. If you know your blood sugar is high, treat it before it gets worse. · If you missed your usual dose of insulin or other diabetes medicine, take the missed dose or take the amount your doctor told you to take if this happens. · If you and your doctor decide on a dose of extra-fast-acting insulin, give yourself the right dose. If you take insulin and your doctor has not told you how much fast-acting insulin to take based on your blood sugar level, call your doctor. · Drink extra water or sugar-free drinks to prevent dehydration. · Wait 30 minutes after you take extra insulin or missed medicines. Then check your blood sugar again. · If symptoms of high blood sugar get worse or your blood sugar level keeps rising, call your doctor. If you start to feel sleepy or confused, call 911. When should you call for help? Call 911 anytime you think you may need emergency care. For example, call if:  · You passed out (lost consciousness). · You are confused or cannot think clearly. · Your blood sugar is very high or very low. Watch closely for changes in your health, and be sure to contact your doctor if:  · Your blood sugar stays outside the level your doctor set for you. · You have any problems. Where can you learn more? Go to http://waldemar-nikolai.info/. Doris Barrera in the search box to learn more about \"Diabetic Ketoacidosis (DKA): Care Instructions. \"  Current as of: March 13, 2017  Content Version: 11.4  © 8787-1583 [employer]. Care instructions adapted under license by [employer] (which disclaims liability or warranty for this information).  If you have questions about a medical condition or this instruction, always ask your healthcare professional. [address] any warranty or liability for your use of this information.

## 2018-05-31 NOTE — PROGRESS NOTES
Medications were not available at [facility] - had to be ordered and could not be available until next day. Medication prescriptions FAX'd to [address] [place] [address] who could provide all medications today - purchased through Medication assistance program - approved by [employer]. Patient picked up from hospital by his brother and they will stop at pharmacy to   Medications.       Glenn Martin, RN, BSN, ACM   - Medical Oncology  [phone]

## 2018-05-31 NOTE — PROGRESS NOTES
Oncology Nursing Communication Tool  6:52 AM  5/31/2018     Bedside shift change report given to Louie Ferro RN (incoming nurse) by Kamari Dobbs (outgoing nurse) on Dolphus [place]. Report included the following information SBAR, Kardex, Intake/Output, MAR and Recent Results. Shift Summary: transferred to floor last night. Slept most of the night. bs was 268 at bedtime, gave 3 units. Hoping for d/c today. Issues for physician to address: d/c         Oncology Shift Note   Admission Date 5/28/2018   Admission Diagnosis DKA (diabetic ketoacidoses) ([facility] [address]   Code Status Full Code   Consults None      Cardiac Monitoring [] Yes [x] No      Purposeful Hourly Rounding [x] Yes    Cherry Score Total Score: 0   Cherry score 3 or > [] Bed Alarm [] Avasys [] 1:1 sitter [] Patient refused (Place signed refusal form in chart)      Pain Managed [] Yes [] No    Key Pain Meds     The patient is on no pain meds. Influenza Vaccine Received Flu Vaccine for Current Season (usually Sept-March): Not Flu Season           Oxygen needs? [x] Room air Oxygen @  []1L    []2L    []3L   []4L    []5L   []6L     Use home O2? [] Yes [] No  Perform O2 challenge test using  smartphrase (.oxygenchallenge)      Last bowel movement Last Bowel Movement Date: 05/28/18  bowel movement      Urinary Catheter             LDAs               Peripheral IV 05/29/18 Left; Lower Forearm (Active)   Site Assessment Clean, dry, & intact 5/31/2018  3:16 AM   Phlebitis Assessment 0 5/31/2018  3:16 AM   Infiltration Assessment 0 5/31/2018  3:16 AM   Dressing Status Clean, dry, & intact 5/31/2018  3:16 AM   Dressing Type Tape;Transparent 5/31/2018  3:16 AM   Hub Color/Line Status Pink; Infusing 5/31/2018  3:16 AM   Action Taken Open ports on tubing capped 5/30/2018  3:27 PM   Alcohol Cap Used Yes 5/30/2018  3:27 PM                         Readmission Risk Assessment Tool Score Low Risk            6       Total Score        4 Pt.  Coverage (Medicare=5 , Medicaid, or Self-Pay=4)    2 Charlson Comorbidity Score (Age + Comorbid Conditions)        Criteria that do not apply:    Has Seen PCP in Last 6 Months (Yes=3, No=0)    . Living with Significant Other. Assisted Living. LTAC. SNF.  or   Rehab    Patient Length of Stay (>5 days = 3)    IP Visits Last 12 Months (1-3=4, 4=9, >4=11)       Expected Length of Stay 2d 21h   Actual Length of Stay [address]

## 2018-05-31 NOTE — PROGRESS NOTES
Prescriptions FAX'd to [place] [address] to be filled using Medication Assistance Forms - approved by CM Coordinator. Patient to  his own medications at the [place] [address] [facility] when family picks him up for transport home. Patient will have one month of medications and diabetic supplies to use until he has an appointment at his PCP [facility] [place]. Care Management Interventions  PCP Verified by CM:  Yes (Patient sees md at [facility] in [place], [place])  Transition of [address] ([address]): Discharge Planning  Discharge Durable Medical Equipment:  (Patient has glucometer at home. )  Current Support Network: Lives Alone  Confirm Follow Up Transport: Family  Plan discussed with Pt/Family/Caregiver: Yes  Discharge Location  Discharge Placement: Home    Evern Boas, RN, BSN, [address] [place] [address] Oncology  [phone]